# Patient Record
Sex: FEMALE | Race: WHITE | Employment: FULL TIME | URBAN - METROPOLITAN AREA
[De-identification: names, ages, dates, MRNs, and addresses within clinical notes are randomized per-mention and may not be internally consistent; named-entity substitution may affect disease eponyms.]

---

## 2017-06-06 ENCOUNTER — HOSPITAL ENCOUNTER (OUTPATIENT)
Dept: OTHER | Age: 22
Discharge: HOME | End: 2017-06-30
Attending: OBSTETRICS & GYNECOLOGY | Admitting: OBSTETRICS & GYNECOLOGY

## 2017-06-15 ENCOUNTER — ROUTINE PRENATAL (OUTPATIENT)
Dept: PERINATAL CARE | Age: 22
End: 2017-06-15

## 2017-06-15 DIAGNOSIS — Z36.89 ENCOUNTER FOR FETAL ANATOMIC SURVEY: Primary | ICD-10-CM

## 2017-10-12 ENCOUNTER — HOSPITAL ENCOUNTER (OUTPATIENT)
Dept: OTHER | Age: 22
Discharge: HOME | End: 2017-12-10
Attending: OBSTETRICS & GYNECOLOGY | Admitting: OBSTETRICS & GYNECOLOGY

## 2017-10-24 PROBLEM — O48.0 POST TERM PREGNANCY OVER 40 WEEKS: Status: ACTIVE | Noted: 2017-10-24

## 2017-10-24 PROBLEM — Z3A.40 40 WEEKS GESTATION OF PREGNANCY: Status: ACTIVE | Noted: 2017-10-24

## 2017-10-24 PROBLEM — O99.013 MATERNAL ANEMIA, ANTEPARTUM, THIRD TRIMESTER: Status: ACTIVE | Noted: 2017-10-24

## 2017-10-24 PROBLEM — Z34.03 SUPERVISION OF LOW-RISK FIRST PREGNANCY, THIRD TRIMESTER: Status: ACTIVE | Noted: 2017-10-24

## 2017-10-24 PROBLEM — R52 PAIN DURING LABOR: Status: ACTIVE | Noted: 2017-10-24

## 2017-10-24 PROBLEM — R52 PAIN DURING LABOR: Status: RESOLVED | Noted: 2017-10-24 | Resolved: 2017-10-24

## 2020-09-11 ENCOUNTER — HOSPITAL ENCOUNTER (EMERGENCY)
Age: 25
Discharge: ANOTHER HOSPITAL | End: 2020-09-11
Attending: EMERGENCY MEDICINE
Payer: COMMERCIAL

## 2020-09-11 ENCOUNTER — APPOINTMENT (OUTPATIENT)
Dept: CT IMAGING | Age: 25
End: 2020-09-11
Payer: COMMERCIAL

## 2020-09-11 VITALS
WEIGHT: 128 LBS | BODY MASS INDEX: 20.57 KG/M2 | SYSTOLIC BLOOD PRESSURE: 125 MMHG | TEMPERATURE: 99.3 F | HEART RATE: 112 BPM | RESPIRATION RATE: 18 BRPM | OXYGEN SATURATION: 100 % | HEIGHT: 66 IN | DIASTOLIC BLOOD PRESSURE: 88 MMHG

## 2020-09-11 LAB
A/G RATIO: 1.3 (ref 1.1–2.2)
ALBUMIN SERPL-MCNC: 4.4 G/DL (ref 3.4–5)
ALP BLD-CCNC: 79 U/L (ref 40–129)
ALT SERPL-CCNC: 14 U/L (ref 10–40)
ANION GAP SERPL CALCULATED.3IONS-SCNC: 16 MMOL/L (ref 3–16)
AST SERPL-CCNC: 30 U/L (ref 15–37)
BASOPHILS ABSOLUTE: 0 K/UL (ref 0–0.2)
BASOPHILS RELATIVE PERCENT: 0.2 %
BILIRUB SERPL-MCNC: 0.6 MG/DL (ref 0–1)
BUN BLDV-MCNC: 7 MG/DL (ref 7–20)
CALCIUM SERPL-MCNC: 9.6 MG/DL (ref 8.3–10.6)
CHLORIDE BLD-SCNC: 97 MMOL/L (ref 99–110)
CO2: 23 MMOL/L (ref 21–32)
CREAT SERPL-MCNC: <0.5 MG/DL (ref 0.6–1.1)
EOSINOPHILS ABSOLUTE: 0 K/UL (ref 0–0.6)
EOSINOPHILS RELATIVE PERCENT: 0.1 %
GFR AFRICAN AMERICAN: >60
GFR NON-AFRICAN AMERICAN: >60
GLOBULIN: 3.5 G/DL
GLUCOSE BLD-MCNC: 75 MG/DL (ref 70–99)
HCG QUALITATIVE: NEGATIVE
HCT VFR BLD CALC: 40.3 % (ref 36–48)
HEMOGLOBIN: 13.3 G/DL (ref 12–16)
LACTIC ACID, SEPSIS: 0.9 MMOL/L (ref 0.4–1.9)
LYMPHOCYTES ABSOLUTE: 2.1 K/UL (ref 1–5.1)
LYMPHOCYTES RELATIVE PERCENT: 15.2 %
MAGNESIUM: 2 MG/DL (ref 1.8–2.4)
MCH RBC QN AUTO: 27 PG (ref 26–34)
MCHC RBC AUTO-ENTMCNC: 32.9 G/DL (ref 31–36)
MCV RBC AUTO: 82.1 FL (ref 80–100)
MONOCYTES ABSOLUTE: 1.2 K/UL (ref 0–1.3)
MONOCYTES RELATIVE PERCENT: 8.8 %
NEUTROPHILS ABSOLUTE: 10.5 K/UL (ref 1.7–7.7)
NEUTROPHILS RELATIVE PERCENT: 75.7 %
PDW BLD-RTO: 13.8 % (ref 12.4–15.4)
PLATELET # BLD: 245 K/UL (ref 135–450)
PMV BLD AUTO: 8.6 FL (ref 5–10.5)
POTASSIUM REFLEX MAGNESIUM: 3.4 MMOL/L (ref 3.5–5.1)
RBC # BLD: 4.91 M/UL (ref 4–5.2)
SODIUM BLD-SCNC: 136 MMOL/L (ref 136–145)
TOTAL PROTEIN: 7.9 G/DL (ref 6.4–8.2)
WBC # BLD: 13.9 K/UL (ref 4–11)

## 2020-09-11 PROCEDURE — 36415 COLL VENOUS BLD VENIPUNCTURE: CPT

## 2020-09-11 PROCEDURE — 80053 COMPREHEN METABOLIC PANEL: CPT

## 2020-09-11 PROCEDURE — 87040 BLOOD CULTURE FOR BACTERIA: CPT

## 2020-09-11 PROCEDURE — 6360000002 HC RX W HCPCS: Performed by: EMERGENCY MEDICINE

## 2020-09-11 PROCEDURE — 84703 CHORIONIC GONADOTROPIN ASSAY: CPT

## 2020-09-11 PROCEDURE — 70491 CT SOFT TISSUE NECK W/DYE: CPT

## 2020-09-11 PROCEDURE — 96365 THER/PROPH/DIAG IV INF INIT: CPT

## 2020-09-11 PROCEDURE — 83735 ASSAY OF MAGNESIUM: CPT

## 2020-09-11 PROCEDURE — 93005 ELECTROCARDIOGRAM TRACING: CPT | Performed by: EMERGENCY MEDICINE

## 2020-09-11 PROCEDURE — 2580000003 HC RX 258: Performed by: EMERGENCY MEDICINE

## 2020-09-11 PROCEDURE — 85025 COMPLETE CBC W/AUTO DIFF WBC: CPT

## 2020-09-11 PROCEDURE — 99284 EMERGENCY DEPT VISIT MOD MDM: CPT

## 2020-09-11 PROCEDURE — 6360000004 HC RX CONTRAST MEDICATION: Performed by: EMERGENCY MEDICINE

## 2020-09-11 PROCEDURE — 83605 ASSAY OF LACTIC ACID: CPT

## 2020-09-11 PROCEDURE — 96375 TX/PRO/DX INJ NEW DRUG ADDON: CPT

## 2020-09-11 RX ORDER — KETOROLAC TROMETHAMINE 30 MG/ML
15 INJECTION, SOLUTION INTRAMUSCULAR; INTRAVENOUS ONCE
Status: COMPLETED | OUTPATIENT
Start: 2020-09-11 | End: 2020-09-11

## 2020-09-11 RX ORDER — AMOXICILLIN 500 MG/1
500 CAPSULE ORAL 3 TIMES DAILY
Status: ON HOLD | COMMUNITY
End: 2020-09-12 | Stop reason: HOSPADM

## 2020-09-11 RX ORDER — 0.9 % SODIUM CHLORIDE 0.9 %
30 INTRAVENOUS SOLUTION INTRAVENOUS ONCE
Status: COMPLETED | OUTPATIENT
Start: 2020-09-11 | End: 2020-09-11

## 2020-09-11 RX ADMIN — AMPICILLIN SODIUM AND SULBACTAM SODIUM 3 G: 2; 1 INJECTION, POWDER, FOR SOLUTION INTRAMUSCULAR; INTRAVENOUS at 20:46

## 2020-09-11 RX ADMIN — IOPAMIDOL 75 ML: 755 INJECTION, SOLUTION INTRAVENOUS at 21:08

## 2020-09-11 RX ADMIN — SODIUM CHLORIDE 1743 ML: 9 INJECTION, SOLUTION INTRAVENOUS at 20:45

## 2020-09-11 RX ADMIN — KETOROLAC TROMETHAMINE 15 MG: 30 INJECTION, SOLUTION INTRAMUSCULAR at 20:45

## 2020-09-11 ASSESSMENT — ENCOUNTER SYMPTOMS
ABDOMINAL PAIN: 0
VOICE CHANGE: 1
EYE DISCHARGE: 0
DIARRHEA: 0
BACK PAIN: 0
SORE THROAT: 1
COUGH: 0
VOMITING: 0
NAUSEA: 0

## 2020-09-11 ASSESSMENT — PAIN DESCRIPTION - PAIN TYPE: TYPE: ACUTE PAIN

## 2020-09-11 ASSESSMENT — PAIN SCALES - GENERAL
PAINLEVEL_OUTOF10: 8
PAINLEVEL_OUTOF10: 8
PAINLEVEL_OUTOF10: 0

## 2020-09-11 ASSESSMENT — PAIN DESCRIPTION - DESCRIPTORS: DESCRIPTORS: SORE

## 2020-09-11 ASSESSMENT — PAIN DESCRIPTION - FREQUENCY: FREQUENCY: CONTINUOUS

## 2020-09-11 ASSESSMENT — PAIN DESCRIPTION - LOCATION: LOCATION: THROAT

## 2020-09-11 NOTE — ED TRIAGE NOTES
Presents with c/o sore throat for several days. Went to the [facility] clinic and diagnosed with strep throat. Started amoxil 2 days ago. Was told to come back if she worsened. Seen approx 1 hour ago and told she had peritonsillar abscess and to come to ER.

## 2020-09-12 ENCOUNTER — HOSPITAL ENCOUNTER (INPATIENT)
Age: 25
LOS: 1 days | Discharge: HOME | DRG: 133 | End: 2020-09-12
Attending: STUDENT IN AN ORGANIZED HEALTH CARE EDUCATION/TRAINING PROGRAM | Admitting: INTERNAL MEDICINE
Payer: COMMERCIAL

## 2020-09-12 VITALS
HEART RATE: 98 BPM | BODY MASS INDEX: 20.57 KG/M2 | DIASTOLIC BLOOD PRESSURE: 78 MMHG | RESPIRATION RATE: 18 BRPM | WEIGHT: 128 LBS | TEMPERATURE: 98.3 F | OXYGEN SATURATION: 97 % | HEIGHT: 66 IN | SYSTOLIC BLOOD PRESSURE: 123 MMHG

## 2020-09-12 PROBLEM — J36 PERITONSILLAR ABSCESS: Status: ACTIVE | Noted: 2020-09-12

## 2020-09-12 LAB
ANION GAP SERPL CALCULATED.3IONS-SCNC: 13 MMOL/L (ref 3–16)
BASOPHILS ABSOLUTE: 0 K/UL (ref 0–0.2)
BASOPHILS RELATIVE PERCENT: 0.5 %
BUN BLDV-MCNC: 8 MG/DL (ref 7–20)
CALCIUM SERPL-MCNC: 9.5 MG/DL (ref 8.3–10.6)
CHLORIDE BLD-SCNC: 107 MMOL/L (ref 99–110)
CO2: 18 MMOL/L (ref 21–32)
CREAT SERPL-MCNC: <0.5 MG/DL (ref 0.6–1.1)
EOSINOPHILS ABSOLUTE: 0 K/UL (ref 0–0.6)
EOSINOPHILS RELATIVE PERCENT: 0 %
GFR AFRICAN AMERICAN: >60
GFR NON-AFRICAN AMERICAN: >60
GLUCOSE BLD-MCNC: 122 MG/DL (ref 70–99)
HCT VFR BLD CALC: 40.8 % (ref 36–48)
HEMOGLOBIN: 13.4 G/DL (ref 12–16)
LYMPHOCYTES ABSOLUTE: 0.9 K/UL (ref 1–5.1)
LYMPHOCYTES RELATIVE PERCENT: 9.3 %
MAGNESIUM: 2.1 MG/DL (ref 1.8–2.4)
MAGNESIUM: 2.5 MG/DL (ref 1.8–2.4)
MCH RBC QN AUTO: 27.2 PG (ref 26–34)
MCHC RBC AUTO-ENTMCNC: 32.8 G/DL (ref 31–36)
MCV RBC AUTO: 82.9 FL (ref 80–100)
MONOCYTES ABSOLUTE: 0.1 K/UL (ref 0–1.3)
MONOCYTES RELATIVE PERCENT: 1.6 %
NEUTROPHILS ABSOLUTE: 8.2 K/UL (ref 1.7–7.7)
NEUTROPHILS RELATIVE PERCENT: 88.6 %
PDW BLD-RTO: 13.9 % (ref 12.4–15.4)
PLATELET # BLD: 235 K/UL (ref 135–450)
PMV BLD AUTO: 9.2 FL (ref 5–10.5)
POTASSIUM SERPL-SCNC: 4.8 MMOL/L (ref 3.5–5.1)
RBC # BLD: 4.93 M/UL (ref 4–5.2)
SODIUM BLD-SCNC: 138 MMOL/L (ref 136–145)
WBC # BLD: 9.2 K/UL (ref 4–11)

## 2020-09-12 PROCEDURE — 83735 ASSAY OF MAGNESIUM: CPT

## 2020-09-12 PROCEDURE — 0C9P3ZX DRAINAGE OF TONSILS, PERCUTANEOUS APPROACH, DIAGNOSTIC: ICD-10-PCS | Performed by: OTOLARYNGOLOGY

## 2020-09-12 PROCEDURE — 2060000000 HC ICU INTERMEDIATE R&B

## 2020-09-12 PROCEDURE — 99253 IP/OBS CNSLTJ NEW/EST LOW 45: CPT | Performed by: OTOLARYNGOLOGY

## 2020-09-12 PROCEDURE — 6370000000 HC RX 637 (ALT 250 FOR IP): Performed by: OTOLARYNGOLOGY

## 2020-09-12 PROCEDURE — 6360000002 HC RX W HCPCS: Performed by: INTERNAL MEDICINE

## 2020-09-12 PROCEDURE — 42700 I&D ABSCESS PERITONSILLAR: CPT | Performed by: OTOLARYNGOLOGY

## 2020-09-12 PROCEDURE — 2580000003 HC RX 258: Performed by: INTERNAL MEDICINE

## 2020-09-12 PROCEDURE — 87205 SMEAR GRAM STAIN: CPT

## 2020-09-12 PROCEDURE — 36415 COLL VENOUS BLD VENIPUNCTURE: CPT

## 2020-09-12 PROCEDURE — 99284 EMERGENCY DEPT VISIT MOD MDM: CPT

## 2020-09-12 PROCEDURE — 85025 COMPLETE CBC W/AUTO DIFF WBC: CPT

## 2020-09-12 PROCEDURE — 80048 BASIC METABOLIC PNL TOTAL CA: CPT

## 2020-09-12 PROCEDURE — 87070 CULTURE OTHR SPECIMN AEROBIC: CPT

## 2020-09-12 PROCEDURE — 2500000003 HC RX 250 WO HCPCS: Performed by: OTOLARYNGOLOGY

## 2020-09-12 RX ORDER — ONDANSETRON 2 MG/ML
4 INJECTION INTRAMUSCULAR; INTRAVENOUS EVERY 6 HOURS PRN
Status: DISCONTINUED | OUTPATIENT
Start: 2020-09-12 | End: 2020-09-13 | Stop reason: HOSPADM

## 2020-09-12 RX ORDER — HYDROCODONE BITARTRATE AND ACETAMINOPHEN 5; 325 MG/1; MG/1
1 TABLET ORAL EVERY 4 HOURS PRN
Qty: 30 TABLET | Refills: 0 | Status: SHIPPED | OUTPATIENT
Start: 2020-09-12 | End: 2020-09-17

## 2020-09-12 RX ORDER — AMOXICILLIN AND CLAVULANATE POTASSIUM 562.5; 437.5; 62.5 MG/1; MG/1; MG/1
2 TABLET, FILM COATED, EXTENDED RELEASE ORAL 2 TIMES DAILY
Qty: 40 TABLET | Refills: 0 | Status: SHIPPED | OUTPATIENT
Start: 2020-09-12 | End: 2020-09-22

## 2020-09-12 RX ORDER — ACETAMINOPHEN 650 MG/1
650 SUPPOSITORY RECTAL EVERY 6 HOURS PRN
Status: DISCONTINUED | OUTPATIENT
Start: 2020-09-12 | End: 2020-09-13 | Stop reason: HOSPADM

## 2020-09-12 RX ORDER — SODIUM CHLORIDE 0.9 % (FLUSH) 0.9 %
10 SYRINGE (ML) INJECTION PRN
Status: DISCONTINUED | OUTPATIENT
Start: 2020-09-12 | End: 2020-09-13 | Stop reason: HOSPADM

## 2020-09-12 RX ORDER — MORPHINE SULFATE 2 MG/ML
2 INJECTION, SOLUTION INTRAMUSCULAR; INTRAVENOUS EVERY 4 HOURS PRN
Status: DISCONTINUED | OUTPATIENT
Start: 2020-09-12 | End: 2020-09-13 | Stop reason: HOSPADM

## 2020-09-12 RX ORDER — PROMETHAZINE HYDROCHLORIDE 25 MG/1
12.5 TABLET ORAL EVERY 6 HOURS PRN
Status: DISCONTINUED | OUTPATIENT
Start: 2020-09-12 | End: 2020-09-13 | Stop reason: HOSPADM

## 2020-09-12 RX ORDER — POLYETHYLENE GLYCOL 3350 17 G/17G
17 POWDER, FOR SOLUTION ORAL DAILY PRN
Status: DISCONTINUED | OUTPATIENT
Start: 2020-09-12 | End: 2020-09-13 | Stop reason: HOSPADM

## 2020-09-12 RX ORDER — ACETAMINOPHEN 325 MG/1
650 TABLET ORAL EVERY 6 HOURS PRN
Status: DISCONTINUED | OUTPATIENT
Start: 2020-09-12 | End: 2020-09-13 | Stop reason: HOSPADM

## 2020-09-12 RX ORDER — LIDOCAINE HYDROCHLORIDE AND EPINEPHRINE 10; 10 MG/ML; UG/ML
20 INJECTION, SOLUTION INFILTRATION; PERINEURAL ONCE
Status: DISCONTINUED | OUTPATIENT
Start: 2020-09-12 | End: 2020-09-12 | Stop reason: RX

## 2020-09-12 RX ORDER — POTASSIUM CHLORIDE AND SODIUM CHLORIDE 900; 300 MG/100ML; MG/100ML
INJECTION, SOLUTION INTRAVENOUS CONTINUOUS
Status: DISCONTINUED | OUTPATIENT
Start: 2020-09-12 | End: 2020-09-13 | Stop reason: HOSPADM

## 2020-09-12 RX ORDER — SODIUM CHLORIDE 0.9 % (FLUSH) 0.9 %
10 SYRINGE (ML) INJECTION EVERY 12 HOURS SCHEDULED
Status: DISCONTINUED | OUTPATIENT
Start: 2020-09-12 | End: 2020-09-13 | Stop reason: HOSPADM

## 2020-09-12 RX ORDER — DEXAMETHASONE SODIUM PHOSPHATE 4 MG/ML
4 INJECTION, SOLUTION INTRA-ARTICULAR; INTRALESIONAL; INTRAMUSCULAR; INTRAVENOUS; SOFT TISSUE EVERY 6 HOURS
Status: DISCONTINUED | OUTPATIENT
Start: 2020-09-12 | End: 2020-09-13 | Stop reason: HOSPADM

## 2020-09-12 RX ADMIN — SODIUM CHLORIDE 3 G: 900 INJECTION INTRAVENOUS at 08:56

## 2020-09-12 RX ADMIN — BENZOCAINE, BUTAMBEN, AND TETRACAINE HYDROCHLORIDE 1 SPRAY: .028; .004; .004 AEROSOL, SPRAY TOPICAL at 19:00

## 2020-09-12 RX ADMIN — DEXAMETHASONE SODIUM PHOSPHATE 4 MG: 4 INJECTION, SOLUTION INTRAMUSCULAR; INTRAVENOUS at 13:30

## 2020-09-12 RX ADMIN — SODIUM CHLORIDE 3 G: 900 INJECTION INTRAVENOUS at 19:49

## 2020-09-12 RX ADMIN — POTASSIUM CHLORIDE AND SODIUM CHLORIDE: 900; 300 INJECTION, SOLUTION INTRAVENOUS at 14:46

## 2020-09-12 RX ADMIN — DEXAMETHASONE SODIUM PHOSPHATE 4 MG: 4 INJECTION, SOLUTION INTRAMUSCULAR; INTRAVENOUS at 07:36

## 2020-09-12 RX ADMIN — ENOXAPARIN SODIUM 40 MG: 40 INJECTION SUBCUTANEOUS at 08:56

## 2020-09-12 RX ADMIN — SODIUM CHLORIDE 3 G: 900 INJECTION INTRAVENOUS at 02:10

## 2020-09-12 RX ADMIN — POTASSIUM CHLORIDE AND SODIUM CHLORIDE: 900; 300 INJECTION, SOLUTION INTRAVENOUS at 02:10

## 2020-09-12 RX ADMIN — DEXAMETHASONE SODIUM PHOSPHATE 4 MG: 4 INJECTION, SOLUTION INTRAMUSCULAR; INTRAVENOUS at 19:49

## 2020-09-12 RX ADMIN — ONDANSETRON 4 MG: 2 INJECTION INTRAMUSCULAR; INTRAVENOUS at 18:48

## 2020-09-12 RX ADMIN — MORPHINE SULFATE 2 MG: 2 INJECTION, SOLUTION INTRAMUSCULAR; INTRAVENOUS at 18:48

## 2020-09-12 RX ADMIN — LIDOCAINE HYDROCHLORIDE,EPINEPHRINE BITARTRATE 20 ML: 10; .01 INJECTION, SOLUTION INFILTRATION; PERINEURAL at 18:48

## 2020-09-12 RX ADMIN — SODIUM CHLORIDE 3 G: 900 INJECTION INTRAVENOUS at 14:46

## 2020-09-12 RX ADMIN — LIDOCAINE HYDROCHLORIDE 20 ML: 10; .005 INJECTION, SOLUTION EPIDURAL; INFILTRATION; INTRACAUDAL; PERINEURAL at 19:00

## 2020-09-12 RX ADMIN — DEXAMETHASONE SODIUM PHOSPHATE 4 MG: 4 INJECTION, SOLUTION INTRAMUSCULAR; INTRAVENOUS at 00:59

## 2020-09-12 ASSESSMENT — PAIN SCALES - GENERAL
PAINLEVEL_OUTOF10: 0

## 2020-09-12 NOTE — PROGRESS NOTES
[address] [facility]       Otolaryngology Consultation      Requesting Physician:  Latrice Hankins MD       Date of Consultation:  9/12/2020      History Obtained From:  patient, electronic medical record      [address] / Reason for Consult:  Peritonsillar abscess. HISTORY OF PRESENT ILLNESS:    (Location, Quality, Severity, Duration, Timing, Context, Modifying factors, Associated symptoms)     Bertin Trejo is a 22 y.o. female is a new patient to me. An otolaryngologic consultation was requested for evaluation of right peritonsilltis. She stated that she has had a sore throat for about 4 days, onset Tuesday it progressed and worsened. She presented to the [facility] ER last night and was transferred here for admission and further care. She was having difficulty opening her mouth . \"I could barely talk. \"  She stated that her voice was abnormal and sounded muffled. She was having trouble swallowing. She denied any dyspnea at any time. She stated that she is feeling better now than when in the ER. She denied recurrent strep throat. Her most recent episode of strep throat was about two years ago. She denied any other epsiodes in the past three years. REVIEW OF SYSTEMS:    I reviewed the ROS on the admission history and physical by Latrice Hankins MD on 9/12/2020 and there has been no change in the information. Past Medical History:   History reviewed. No pertinent past medical history. Past Surgical History:    History reviewed. No pertinent surgical history.     Current Medications:   Current Facility-Administered Medications: 0.9% NaCl with KCl 40 mEq infusion, , Intravenous, Continuous  dexamethasone (DECADRON) injection 4 mg, 4 mg, Intravenous, Q6H  ampicillin-sulbactam (UNASYN) 3 g ivpb minibag, 3 g, Intravenous, Q6H  sodium chloride flush 0.9 % injection 10 mL, 10 mL, Intravenous, 2 times per day  sodium chloride flush 0.9 % injection 10 mL, 10 mL, Intravenous, PRN  acetaminophen (TYLENOL) tablet 650 mg, 650 mg, Oral, Q6H PRN **OR** acetaminophen (TYLENOL) suppository 650 mg, 650 mg, Rectal, Q6H PRN  polyethylene glycol (GLYCOLAX) packet 17 g, 17 g, Oral, Daily PRN  promethazine (PHENERGAN) tablet 12.5 mg, 12.5 mg, Oral, Q6H PRN **OR** ondansetron (ZOFRAN) injection 4 mg, 4 mg, Intravenous, Q6H PRN  enoxaparin (LOVENOX) injection 40 mg, 40 mg, Subcutaneous, Daily  morphine (PF) injection 2 mg, 2 mg, Intravenous, Q4H PRN    Allergies:  No Known Allergies     Social History:    TOBACCO:   reports that she has never smoked. She has never used smokeless tobacco.  ETOH:   reports no history of alcohol use. Family History:       Problem Relation Age of Onset    High Blood Pressure Maternal Grandmother     Heart Disease Maternal Grandfather     Stroke Maternal Grandfather            PHYSICAL EXAM:      Constitutional:    Vitals:    Vitals:    09/12/20 1300   BP: 116/80   Pulse: 94   Resp: 18   Temp: 98.2 °F (36.8 °C)   SpO2: 97%        General appearance:  WDWN NAD, no stridor, does not appear toxic. Voice:  Normal, with no hot potato voice. Eyes:  EOMI. Sclerae and conjunctivae normal    Ears, Nose, Mouth and Throat:  Mild fullness right anterior tonsillar pillar. Mild trismus to 1.5 tongue blade width at central incisor. TMs and EACs normal.  Intranasal exam normal.  OC normal.    Neck:  Mild tenderness right anterior cervical area. Lymphatic:  Facial, neck and supraclavicular lymph nodes were normal to palpation.   Musculoskeletal:  REDDING        LABORATORY:  CBC with Differential:    Lab Results   Component Value Date    WBC 13.9 09/11/2020    RBC 4.91 09/11/2020    HGB 13.3 09/11/2020    HCT 40.3 09/11/2020     09/11/2020    MCV 82.1 09/11/2020    MCH 27.0 09/11/2020    MCHC 32.9 09/11/2020    RDW 13.8 09/11/2020    LYMPHOPCT 15.2 09/11/2020    MONOPCT 8.8 09/11/2020    BASOPCT 0.2 09/11/2020    MONOSABS 1.2 09/11/2020    LYMPHSABS 2.1 09/11/2020    EOSABS 0.0 09/11/2020    BASOSABS 0.0 09/11/2020         REVIEW OF IMAGING:  Narrative    EXAMINATION:    CT OF THE NECK SOFT TISSUE WITH CONTRAST  9/11/2020      FINDINGS:    PHARYNX/LARYNX:  The right palatine tonsil is asymmetrically enlarged with a    central, irregularity of hypodensity or fluid measuring 1.9 x 1.4 cm.  There    is mass effect on the parapharyngeal space.         The airway remains widely patent.  Epiglottis is normal in thickness.  False    and true vocal cords are normal.  There is no prevertebral soft tissue    swelling.         SALIVARY GLANDS/THYROID:  The parotid and submandibular glands appear    unremarkable.  The thyroid gland appears unremarkable.         LYMPH NODES:  There are enlarged right cervical lymph nodes.  For instance a    right level 2 node is 1.4 x 1.1 cm.  A left level 2 lymph node is 1.7 x 1.1    cm.         SOFT TISSUES:  No appreciable soft tissue swelling or mass is seen.         BRAIN/ORBITS/SINUSES:  The visualized portion of the intracranial contents    appear unremarkable.  The visualized portion of the orbits, paranasal sinuses    and mastoid air cells demonstrate no acute abnormality.         LUNG APICES/SUPERIOR MEDIASTINUM:  No focal consolidation is seen within the    visualized lung apices.  No superior mediastinal lymphadenopathy or mass. The visualized portion of the trachea appears unremarkable.         BONES:  There is mild reverse curvature of the cervical spine.  No acute    osseous abnormality is seen. [place] is no significant spondylosis.           Impression    1.9 x 1.4 cm right peritonsillar abscess.         Bilateral cervical adenopathy, more prominent on the right side.               IMPRESSION:   Right peritonsillar cellulitis versus abscess, improved on IV antibiotics. RECOMMENDATIONS:    1. Continue IV antibiotics. 2. Incision and drainage if no improvement after 24 to 48 hours of IV antibiotics.         TIME:  Total unit time = 55 minutes.

## 2020-09-12 NOTE — PROCEDURES
PROCEDURE:  Needle drainage aspiration of right peritonsillar abscess, at the bedside. INFORMED CONSENT:  The patient was counseled for the procedure, which was described. She was advised of the risks and potential complications, including but not limited to:  Bleeding, pain, and persistent abscess requiring incision and drainage . All patients questions were answered. Patient stated she understands and accepts all of the preceding and desires to proceed. DESCRIPTION OF PROCEDURE:  The right anterior tonsillar pillar was sprayed with Cetacaine spray. After 60-90 seconds, the left anterior tonsillar pillar was infiltrated with 3 ml of 2% lidocaine with 1:100,000 epinephrine. After 5 minutes, the needle aspiration was performed with an 18 gauge needle and 10 cc syringe, aspirating 2 ml of purulent exudate. Specimen placed sterilely on culture swab for all bacterial pathogens, culture and sensitivity. Patient tolerated the procedure well with no complications. PLAN:  1. Will change to Augmentin XR 2 po BID for 10 days. 2. Patient agreed to return to the ER at [facility] if her condition worsens. 3. Patient agreed to see me as outpatient if she continues to experience recurrent strept throat or tonsillitis or if she has another episode of peritonsillar abscess, in which case a tonsillectomy would be indicated. 4. If no improvement after 24 hours, will take to OR for incision and drainage.

## 2020-09-12 NOTE — H&P
pertinent surgical history. Medications (prior to admission):  Prior to Admission medications    Medication Sig Start Date End Date Taking? Authorizing Provider   amoxicillin (AMOXIL) 500 MG capsule Take 500 mg by mouth 3 times daily   Yes Historical Provider, MD       Allergy(ies):  Patient has no known allergies. Social History:  TOBACCO:  reports that she has never smoked. She has never used smokeless tobacco.  ETOH:  reports no history of alcohol use. Family History:      Problem Relation Age of Onset    High Blood Pressure Maternal Grandmother     Heart Disease Maternal Grandfather     Stroke Maternal Grandfather        Review of Systems:  Pertinent positives are listed in HPI. At least 10-point ROS reviewed and were negative. Vitals and physical examination:  BP (!) 156/106   Pulse 121   Temp 98.3 °F (36.8 °C) (Oral)   Resp 16   Wt 128 lb (58.1 kg)   SpO2 100%   BMI 20.66 kg/m²   Gen/overall appearance: Not in acute distress. Alert. Oriented x3. Head: Normocephalic, atraumatic  Eyes: EOMI, good acuity  ENT: Right peritonsillar exudate, bilateral tonsillar erythema. Neck: No JVD, thyromegaly  CVS: Nml S1S2, no MRG. Tachycardic. Pulm: Clear bilaterally. No crackles/wheezes  Gastrointestinal: Soft, NT/ND, +BS  Musculoskeletal: No edema. Warm  Neuro: No focal deficit. Moves extremity spontaneously. Psychiatry: Appropriate affect. Not agitated. Skin: Warm, dry with normal turgor.  No rash  Capillary refill: Brisk,< 3 seconds   Peripheral Pulses: +2 palpable, equal bilaterally       Labs/imaging/EKG:  CBC:   Recent Labs     09/11/20 2023   WBC 13.9*   HGB 13.3        BMP:    Recent Labs     09/11/20 2023      K 3.4*   CL 97*   CO2 23   BUN 7   CREATININE <0.5*   GLUCOSE 75     Hepatic:   Recent Labs     09/11/20 2023   AST 30   ALT 14   BILITOT 0.6   ALKPHOS 79       Ct Soft Tissue Neck W Contrast    Result Date: 9/11/2020  EXAMINATION: CT OF THE NECK SOFT TISSUE WITH CONTRAST  9/11/2020 TECHNIQUE: CT of the neck was performed with the administration of intravenous contrast. Multiplanar reformatted images are provided for review. Dose modulation, iterative reconstruction, and/or weight based adjustment of the mA/kV was utilized to reduce the radiation dose to as low as reasonably achievable. COMPARISON: None. HISTORY: ORDERING SYSTEM PROVIDED HISTORY: right peritonsilar abscess TECHNOLOGIST PROVIDED HISTORY: Reason for exam:->right peritonsilar abscess Reason for Exam: right peritonsilar abscess Acuity: Acute Type of Exam: Initial FINDINGS: PHARYNX/LARYNX:  The right palatine tonsil is asymmetrically enlarged with a central, irregularity of hypodensity or fluid measuring 1.9 x 1.4 cm. There is mass effect on the parapharyngeal space. The airway remains widely patent. Epiglottis is normal in thickness. False and true vocal cords are normal.  There is no prevertebral soft tissue swelling. SALIVARY GLANDS/THYROID:  The parotid and submandibular glands appear unremarkable. The thyroid gland appears unremarkable. LYMPH NODES:  There are enlarged right cervical lymph nodes. For instance a right level 2 node is 1.4 x 1.1 cm. A left level 2 lymph node is 1.7 x 1.1 cm. SOFT TISSUES:  No appreciable soft tissue swelling or mass is seen. BRAIN/ORBITS/SINUSES:  The visualized portion of the intracranial contents appear unremarkable. The visualized portion of the orbits, paranasal sinuses and mastoid air cells demonstrate no acute abnormality. LUNG APICES/SUPERIOR MEDIASTINUM:  No focal consolidation is seen within the visualized lung apices. No superior mediastinal lymphadenopathy or mass. The visualized portion of the trachea appears unremarkable. BONES:  There is mild reverse curvature of the cervical spine. No acute osseous abnormality is seen. There is no significant spondylosis. 1.9 x 1.4 cm right peritonsillar abscess.  Bilateral cervical adenopathy, more prominent on the

## 2020-09-12 NOTE — DISCHARGE INSTR - COC
Continuity of Care Form    Patient Name: Ruddy Cheng   :  1995  MRN:  [id number]    Admit date:  2020  Discharge date:  ***    Code Status Order: Full Code   Advance Directives:   Advance Care Flowsheet Documentation     Date/Time Healthcare Directive Type of Healthcare Directive Copy in [address] Agent's Name Healthcare Agent's Phone Number    [phone]  No, patient does not have an advance directive for healthcare treatment -- -- -- -- --          Admitting Physician:  Sonia Gray MD  PCP: Referring Not In System (Inactive)    Discharging Nurse: [facility] Unit/Room#: [id number]-[id number]/2727-64  Discharging Unit Phone Number: ***    Emergency Contact:   Extended Emergency Contact Information  Primary Emergency Contact: Zenia Zelaya  Address: [facility]. [place] [address] [place] [address]  Home Phone: [phone]  Mobile Phone: [phone]  Relation: Parent  Secondary Emergency Contact: Emigdio Butler  Address: [address]  Home Phone: [phone]  Mobile Phone: [phone]  Relation: Parent    Past Surgical History:  History reviewed. No pertinent surgical history. Immunization History: There is no immunization history for the selected administration types on file for this patient.     Active Problems:  Patient Active Problem List   Diagnosis Code    Supervision of low-risk first pregnancy, third trimester Z34.03    Post term pregnancy over 40 weeks O48.0    40 weeks gestation of pregnancy Z3A.40    Maternal anemia, antepartum, third trimester O99.013    Vaginal delivery O80    Peritonsillar abscess J36       Isolation/Infection:   Isolation          No Isolation        Patient Infection Status     None to display          Nurse Assessment:  Last Vital Signs: BP (!) 165/115   Pulse 127   Temp 99.2 °F (37.3 °C) (Oral)   Resp 20   Ht 5' 6\" (1.676 m)   Wt 128 lb (58.1 kg)   SpO2 95%   BMI 20.66 kg/m²     Last documented pain score (0-10 scale): Pain Level: 0  Last Weight:   Wt Readings from Last 1 Encounters:   20 128 lb (58.1 kg)     Mental Status:  {IP PT MENTAL STATUS:99889}    IV Access:  { MIRYAM IV ACCESS:612572489}    Nursing Mobility/ADLs:  Walking   {CHP DME RPNH:131850222}  Transfer  {CHP DME YBYY:021164450}  Bathing  {CHP DME FRWK:775689302}  Dressing  {CHP DME TWML:807019003}  Toileting  {CHP DME DPDI:432315475}  Feeding  {CHP DME VKV}  Med Admin  {CHP DME XKYA:611490199}  Med Delivery   { MIRYAM MED Delivery:946688055}    Wound Care Documentation and Therapy:        Elimination:  Continence:   · Bowel: {YES / EE:27948}  · Bladder: {YES / MS:43665}  Urinary Catheter: {Urinary Catheter:939582206}   Colostomy/Ileostomy/Ileal Conduit: {YES / BE:48180}       Date of Last BM: ***    Intake/Output Summary (Last 24 hours) at 2020  Last data filed at 2020 1446  Gross per 24 hour   Intake 1358 ml   Output --   Net 1358 ml     I/O last 3 completed shifts: In: 4304 [P.O.:240;  I.V.:1018; IV Piggyback:100]  Out: -     Safety Concerns:     508 RSens Safety Concerns:458770396}    Impairments/Disabilities:      [address] RSens Impairments/Disabilities:800575518}    Nutrition Therapy:  Current Nutrition Therapy:   508 RSens Diet List:906524816}    Routes of Feeding: {CHP DME Other Feedings:269374916}  Liquids: {Slp liquid thickness:80160}  Daily Fluid Restriction: {CHP DME Yes amt example:966261438}  Last Modified Barium Swallow with Video (Video Swallowing Test): {Done Not Done NGUE:118016751}    Treatments at the Time of Hospital Discharge:   Respiratory Treatments: ***  Oxygen Therapy:  {Therapy; copd oxygen:92812}  Ventilator:    { CC Vent MQGW:391255555}    Rehab Therapies: {THERAPEUTIC INTERVENTION:4921315239}  Weight Bearing Status/Restrictions: [address] Sherri BAZAN Weight Bearin}  Other Medical Equipment (for information only, NOT a DME order):  {EQUIPMENT:928726923}  Other Treatments: ***    Patient's personal belongings (please select all that are sent with patient):  {CHP DME Belongings:938132065}    RN SIGNATURE:  {Esignature:072148448}    CASE MANAGEMENT/SOCIAL WORK SECTION    Inpatient Status Date: ***    Readmission Risk Assessment Score:  Readmission Risk              Risk of Unplanned Readmission:        9           Discharging to Facility/ Agency   · Name:   · Address:  · Phone:  · Fax:    Dialysis Facility (if applicable)   · Name:  · Address:  · Dialysis Schedule:  · Phone:  · Fax:    / signature: {Esignature:566702594}    PHYSICIAN SECTION    Prognosis: {Prognosis:7195631065}    Condition at Discharge: [facility] Patient Condition:247774829}    Rehab Potential (if transferring to Rehab): {Prognosis:4659394744}    Recommended Labs or Other Treatments After Discharge: ***    Physician Certification: I certify the above information and transfer of Emerson Cooper  is necessary for the continuing treatment of the diagnosis listed and that she requires {Admit to Appropriate Level of Care:09363} for {GREATER/LESS:922629140} 30 days.      Update Admission H&P: {CHP DME Changes in FAGPQ:431894817}    PHYSICIAN SIGNATURE:  {Esignature:217761927}

## 2020-09-12 NOTE — ED PROVIDER NOTES
Primary Care Physician: Referring Not In System (Inactive)   Attending Physician: Maty Frye MD     History   Chief Complaint   Patient presents with    Pharyngitis     Pt seen at [facility] three days ago and told she had strep. Returned when pain and swelling occurred today, told to go to ED at [facility]. Came here to see Dr. Michael SALAZAR   Winnie Portillo is a 22 y.o. female no significant history who presents this evening transfer from [facility] for peritonsillar abscess. She states that she developed some neck pain or sore throat 4 days ago and was seen at urgent care. He was treated with antibiotics and recommended to return to the emergency room if her symptoms were worse. Started taking antibiotics for 4 days she did not feel any relief. Her swelling and pain got worse and she was reevaluated at outside hospital ED where a CT was scan and showed peritonsillar abscess. ENT was consulted and recommend patient be transferred to [facility] for evaluation. Patient was given Unasyn and 2 L of normal saline. She states that she is feeling much better but her heart rate is still elevated. History reviewed. No pertinent past medical history. History reviewed. No pertinent surgical history. Family History   Problem Relation Age of Onset    High Blood Pressure Maternal Grandmother     Heart Disease Maternal Grandfather     Stroke Maternal Grandfather         Social History     Socioeconomic History    Marital status: Single     Spouse name: None    Number of children: None    Years of education: None    Highest education level: None   Occupational History    None   Social Needs    Financial resource strain: None    Food insecurity     Worry: None     Inability: None    Transportation needs     Medical: None     Non-medical: None   Tobacco Use    Smoking status: Never Smoker    Smokeless tobacco: Never Used   Substance and Sexual Activity    Alcohol use: No    Drug use:  No  Sexual activity: Yes     Partners: Male   Lifestyle    Physical activity     Days per week: None     Minutes per session: None    Stress: None   Relationships    Social connections     Talks on phone: None     Gets together: None     Attends Sabianism service: None     Active member of club or organization: None     Attends meetings of clubs or organizations: None     Relationship status: None    Intimate partner violence     Fear of current or ex partner: None     Emotionally abused: None     Physically abused: None     Forced sexual activity: None   Other Topics Concern    None   Social History Narrative    None        Review of Systems   10 total systems reviewed and found to be negative unless otherwise noted in HPI     Physical Exam   /87   Pulse 108   Temp 98.9 °F (37.2 °C) (Oral)   Resp 14   Ht 5' 6\" (1.676 m)   Wt 128 lb (58.1 kg)   SpO2 98%   BMI 20.66 kg/m²      CONSTITUTIONAL: Well appearing, in no acute distress   HEAD: atraumatic, normocephalic   EYES: PERRL, No injection, discharge or scleral icterus. ENT: Moist mucous membranes. NECK: Normal ROM, but adenopathy with some tonsillar exudates  CARDIOVASCULAR: Regular rate and rhythm. No murmurs or gallop. PULMONARY/CHEST: Airway patent. No retractions. Breath sounds clear with good air entry bilaterally. ABDOMEN: Soft, Non-distended and non-tender, without guarding or rebound. SKIN: Acyanotic, warm, dry   MUSCULOSKELETAL: No swelling, tenderness or deformity   NEUROLOGICAL: Awake and oriented x 3. Pulses intact. Grossly nonfocal   Nursing note and vitals reviewed.      ED Course & Medical Decision Making   Medications   0.9% NaCl with KCl 40 mEq infusion ( Intravenous New Bag 9/12/20 0210)   dexamethasone (DECADRON) injection 4 mg (4 mg Intravenous Given 9/12/20 0059)   ampicillin-sulbactam (UNASYN) 3 g ivpb minibag (0 g Intravenous Stopped 9/12/20 0235)   sodium chloride flush 0.9 % injection 10 mL (has no administration submandibular glands appear unremarkable. The thyroid gland appears unremarkable. LYMPH NODES:  There are enlarged right cervical lymph nodes. For instance a right level 2 node is 1.4 x 1.1 cm. A left level 2 lymph node is 1.7 x 1.1 cm. SOFT TISSUES:  No appreciable soft tissue swelling or mass is seen. BRAIN/ORBITS/SINUSES:  The visualized portion of the intracranial contents appear unremarkable. The visualized portion of the orbits, paranasal sinuses and mastoid air cells demonstrate no acute abnormality. LUNG APICES/SUPERIOR MEDIASTINUM:  No focal consolidation is seen within the visualized lung apices. No superior mediastinal lymphadenopathy or mass. The visualized portion of the trachea appears unremarkable. BONES:  There is mild reverse curvature of the cervical spine. No acute osseous abnormality is seen. There is no significant spondylosis. 1.9 x 1.4 cm right peritonsillar abscess. Bilateral cervical adenopathy, more prominent on the right side. PROCEDURES:   Procedures    ASSESSMENT AND PLAN:  Lanie Tierney is a 22 y.o. female presenting transferred from outside facility with peritonsillar abscess. She not been seen on day where CT was performed and showed peritonsillar abscess. ENT was consulted and patient was recommended the patient be transferred here because of privileges. Patient was given IV fluids and Unasyn and upon arrival she was stable. I did contact ENT we recommended admission to the medicine service for further evaluation and treatment. Hospitalist consulted and patient was admitted to their service for further treatment. .ClINICAL IMPRESSION:  1. Peritonsillar abscess        DISPOSITION Admitted 09/12/2020 12:36:42 AM   -Findings and recommendations explained to patient. She expressed understanding and agreed with the plan.   Thelma Torres MD (electronically signed)  9/12/2020  _________________________________________________________________________________________  _________________________________________________________________________________________  This record is transcribed utilizing voice recognition technology. There are inherent limitations in this technology. In addition, there may be limitations in editing of this report. If there are any discrepancies, please contact me directly.         Kala Akhtar MD  09/12/20 6338

## 2020-09-12 NOTE — CONSULTS
Patient seen. Full consult report to follow. IMPRESSION:  Right peritonsillar cellulitis versus early abscess, improved with IV antibiotics    RECOMMENDATIONS/PLAN:  1. Continue IV Unasyn. 2. Incision and drainage if no improvement after 24 to 48 hours of IV antibiotics.

## 2020-09-12 NOTE — ED NOTES
Bed: 01  Expected date:   Expected time:   Means of arrival:   Comments:  ENT pt.      Attila King RN  09/12/20 1740

## 2020-09-12 NOTE — ED PROVIDER NOTES
None   Other Topics Concern    None   Social History Narrative    None       SCREENINGS             PHYSICAL EXAM    (up to 7 for level 4, 8 or more for level 5)     ED Triage Vitals [09/11/20 1929]   BP Temp Temp Source Pulse Resp SpO2 Height Weight   (!) 143/110 99.3 °F (37.4 °C) Oral 126 18 99 % 5' 6\" (1.676 m) 128 lb (58.1 kg)       Physical Exam  Constitutional:       General: She is not in acute distress. Appearance: She is well-developed. HENT:      Head: Normocephalic and atraumatic. Right Ear: Tympanic membrane, ear canal and external ear normal.      Left Ear: Tympanic membrane, ear canal and external ear normal.      Nose: Nose normal.      Mouth/Throat:      Mouth: Mucous membranes are moist.      Tongue: No lesions. Tongue does not deviate from midline. Pharynx: No oropharyngeal exudate. Tonsils: Tonsillar abscess present. Eyes:      Conjunctiva/sclera: Conjunctivae normal.      Pupils: Pupils are equal, round, and reactive to light. Neck:      Musculoskeletal: Normal range of motion and neck supple. Cardiovascular:      Rate and Rhythm: Regular rhythm. Tachycardia present. Heart sounds: Normal heart sounds. No murmur. No friction rub. No gallop. Pulmonary:      Effort: Pulmonary effort is normal. No respiratory distress. Breath sounds: Normal breath sounds. No wheezing. Abdominal:      General: Bowel sounds are normal. There is no distension. Palpations: Abdomen is soft. Tenderness: There is no abdominal tenderness. There is no guarding or rebound. Musculoskeletal: Normal range of motion. General: No tenderness. Lymphadenopathy:      Cervical: Cervical adenopathy present. Right cervical: Superficial cervical adenopathy present. Skin:     General: Skin is warm and dry. Capillary Refill: Capillary refill takes less than 2 seconds. Findings: No rash. Neurological:      General: No focal deficit present.       Mental Status: She is alert and oriented to person, place, and time. Cranial Nerves: No cranial nerve deficit. DIAGNOSTIC RESULTS   LABS:    Results for orders placed or performed during the hospital encounter of 09/11/20   Lactate, Sepsis   Result Value Ref Range    Lactic Acid, Sepsis 0.9 0.4 - 1.9 mmol/L   CBC auto differential   Result Value Ref Range    WBC 13.9 (H) 4.0 - 11.0 K/uL    RBC 4.91 4.00 - 5.20 M/uL    Hemoglobin 13.3 12.0 - 16.0 g/dL    Hematocrit 40.3 36.0 - 48.0 %    MCV 82.1 80.0 - 100.0 fL    MCH 27.0 26.0 - 34.0 pg    MCHC 32.9 31.0 - 36.0 g/dL    RDW 13.8 12.4 - 15.4 %    Platelets 745 886 - 280 K/uL    MPV 8.6 5.0 - 10.5 fL    Neutrophils % 75.7 %    Lymphocytes % 15.2 %    Monocytes % 8.8 %    Eosinophils % 0.1 %    Basophils % 0.2 %    Neutrophils Absolute 10.5 (H) 1.7 - 7.7 K/uL    Lymphocytes Absolute 2.1 1.0 - 5.1 K/uL    Monocytes Absolute 1.2 0.0 - 1.3 K/uL    Eosinophils Absolute 0.0 0.0 - 0.6 K/uL    Basophils Absolute 0.0 0.0 - 0.2 K/uL   Comprehensive Metabolic Panel w/ Reflex to MG   Result Value Ref Range    Sodium 136 136 - 145 mmol/L    Potassium reflex Magnesium 3.4 (L) 3.5 - 5.1 mmol/L    Chloride 97 (L) 99 - 110 mmol/L    CO2 23 21 - 32 mmol/L    Anion Gap 16 3 - 16    Glucose 75 70 - 99 mg/dL    BUN 7 7 - 20 mg/dL    CREATININE <0.5 (L) 0.6 - 1.1 mg/dL    GFR Non-African American >60 >60    GFR African American >60 >60    Calcium 9.6 8.3 - 10.6 mg/dL    Total Protein 7.9 6.4 - 8.2 g/dL    Alb 4.4 3.4 - 5.0 g/dL    Albumin/Globulin Ratio 1.3 1.1 - 2.2    Total Bilirubin 0.6 0.0 - 1.0 mg/dL    Alkaline Phosphatase 79 40 - 129 U/L    ALT 14 10 - 40 U/L    AST 30 15 - 37 U/L    Globulin 3.5 g/dL   hCG, serum, qualitative   Result Value Ref Range    hCG Qual Negative Detects HCG level >10 MIU/mL   Magnesium   Result Value Ref Range    Magnesium 2.00 1.80 - 2.40 mg/dL       All other labs were within normal range ornot returned as of this dictation. EKG:  All EKG's are interpreted by the Emergency Department Physician who either signs or Co-signs this chart in the absence of a cardiologist.  Please see their note for interpretation of EKG. EKG Interpretation    Interpreted by emergency department physician    Rhythm: sinus tachycardia  Rate: tachycardia  Axis: normal  Ectopy: none  Conduction: normal  ST Segments: normal  T Waves: normal  Q Waves: none    Clinical Impression: sinus tachycardia      RADIOLOGY:   Non-plain film images such as CT, Ultrasound and MRI are read by the radiologist.  Plain radiographic images are visualized and preliminarily interpreted by the ED Provider with the belowfindings:    Interpretation per the Radiologist below, if available at the time of this note:    CT SOFT TISSUE NECK W CONTRAST   Final Result   1.9 x 1.4 cm right peritonsillar abscess. Bilateral cervical adenopathy, more prominent on the right side. PROCEDURES   Unless otherwise noted below, none     Procedures    CRITICAL CARE TIME   N/A    CONSULTS:  IP CONSULT TO OTOLARYNGOLOGY      EMERGENCY DEPARTMENT COURSE and DIFFERENTIAL DIAGNOSIS/MDM:   Vitals:    Vitals:    09/11/20 1929 09/11/20 2115 09/11/20 2245   BP: (!) 143/110 (!) 135/92 125/88   Pulse: 126 112 112   Resp: 18 18 18   Temp: 99.3 °F (37.4 °C)     TempSrc: Oral     SpO2: 99% 94% 100%   Weight: 128 lb (58.1 kg)     Height: 5' 6\" (1.676 m)         Patient was given the following medications:  Medications   0.9 % sodium chloride IV bolus 1,743 mL (0 mL/kg × 58.1 kg Intravenous Stopped 9/11/20 2241)   ampicillin-sulbactam (UNASYN) 3 g ivpb minibag (0 g Intravenous Stopped 9/11/20 2150)   ketorolac (TORADOL) injection 15 mg (15 mg Intravenous Given 9/11/20 2045)   iopamidol (ISOVUE-370) 76 % injection 75 mL (75 mLs Intravenous Given 9/11/20 2108)       ED Course as of Sep 12 0118   Fri Sep 11, 2020   2200 Patient states she does not have any pain at this point.   I discussed her imaging and lab findings with her.  Awaiting a callback from ear nose and throat. [TC]   1095 Case discussed with Dr. Aga Bradley, on-call for ear nose and throat. He would like the patient to be transferred to [facility] emergency department for possible bedside drainage. Case then discussed with Dr. Layo Alejandro, who has accepted transfer of the patient. Patient at this time would like to go private vehicle. I have explained to her the risks of doing so and she is willing to accept these risks. I reemphasized the need to go directly to their emergency department and to not eat or drink anything.    [TC]      ED Course User Index  [TC] Shyann Jeffers MD     I spoke with Dr. Layo Alejandro, ED physician at [facility]. We thoroughly discussed the history, physical exam, laboratory and imaging studies, as well as, current course of treatment within the emergency department. Based upon that discussion, we've decided to transfer King Ric to [facility], for further observation and evaluation of King Ric current condition. As I have deemed necessary from their history, physical, and studies, I have considered and evaluated King Ric for the following diagnoses: viral URI, nasal congestion, bacterial sinusitis, viral/strep pharyngitis, otitis media, otitis externa, RPA, PTA. /88   Pulse 112   Temp 99.3 °F (37.4 °C) (Oral)   Resp 18   Ht 5' 6\" (1.676 m)   Wt 128 lb (58.1 kg)   SpO2 100%   BMI 20.66 kg/m²     FINAL IMPRESSION      1. Peritonsillar abscess          DISPOSITION/PLAN   DISPOSITION Decision To Transfer 09/11/2020 10:43:36 PM      PATIENT REFERRED TO:  No follow-up provider specified.     DISCHARGE MEDICATIONS:  Discharge Medication List as of 9/11/2020 11:07 PM          DISCONTINUED MEDICATIONS:  Discharge Medication List as of 9/11/2020 11:07 PM                 (Please note that portions of this note were completed with a voice recognition program.  Efforts were made to edit the dictations but occasionally words

## 2020-09-12 NOTE — PROGRESS NOTES
I spoke to patient again and discussed option of continued IV antibiotics and observation for resolution or persistence/progression and need for incision and drainage, versus incision and drainage in the OR or at the bedside which would result in overnight stay, versus needle drainage of the abscess at the bedside. She stated her desire for needle drainage at the bedside and her understanding that she may be able to be discharged after the needle drainage with close outpatient follow up. PLAN:    Needle aspiration and drainage of right peritonsillar abscess or space. Patient was counseled for the procedure, which was described. I advised of the expected outcome and potential benefits of surgery, i.e. resolution of abscess/infection. I advised that certain expected conditions may occur after this procedure, usually temporary, including, but not limited to: bleeding, swelling of neck, drainage, pain and discomfort, sorethroat, hoarseness, and change in voice. I advised of the risks and potential complications,  including, but not limited to, the following:  excessive bleeding/hemorrhage, infection, persistence of any of the above expected conditions, recurrence or persistence of abscess, airway obstruction, need for artificial airway such as endotracheal tube or tracheotomy, adverse reaction to the local anesthetic, including cardiac arrest, stroke, heart attack, and death. I advised of the alternatives of therapy, including, but not limited to:   Further observation and medical treatment, with risk of worsening or spread of infection, airway obstruction, and death, versus incision and drainage in the OR under general anesthesia or at the bedside. I advised of the potential risk, possible consequences, and adverse effects of not undergoing the surgery:  Fatal airway obstruction.  Unresolved infection which could spread and lead to a life-threatening infection, such as a deep neck infection or sepsis

## 2020-09-13 LAB
EKG ATRIAL RATE: 226 BPM
EKG DIAGNOSIS: NORMAL
EKG P AXIS: 83 DEGREES
EKG Q-T INTERVAL: 318 MS
EKG QRS DURATION: 84 MS
EKG QTC CALCULATION (BAZETT): 436 MS
EKG R AXIS: 71 DEGREES
EKG T AXIS: 12 DEGREES
EKG VENTRICULAR RATE: 113 BPM

## 2020-09-13 PROCEDURE — 93010 ELECTROCARDIOGRAM REPORT: CPT | Performed by: INTERNAL MEDICINE

## 2020-09-13 NOTE — PROGRESS NOTES
Pt discharged to home accompanied by family. Verbalized understanding of all discharge instructions and need for follow-up appointment. Pt states she will schedule her own follow-up appointment if. Pt VU to call Dr. Nhung Amaro office if their are any complications. Pt VU of medications for which she received prescriptions. Pt VSS pt able to swallow without pain.  Stacy Mcwilliams RN

## 2020-09-13 NOTE — DISCHARGE SUMMARY
Status: FinalAnion Gap                                     Date: 09/12/2020Value: 13          Ref range: 3 - 16             Status: FinalGlucose                                       Date: 09/12/2020Value: 122*        Ref range: 70 - 99 mg/dL      Status: FinalBUN                                           Date: 09/12/2020Value: 8           Ref range: 7 - 20 mg/dL       Status: FinalCREATININE                                    Date: 09/12/2020Value: <0.5*       Ref range: 0.6 - 1.1 mg/dL    Status: FinalGFR Non-                      Date: 09/12/2020Value: >60         Ref range: >60                Status: Final              Comment: >60 mL/min/1.73m2 EGFR, calc. for ages 25 and older using theMDRD formula (not corrected for weight), is valid for stablerenal function. GFR                           Date: 09/12/2020Value: >60         Ref range: >60                Status: Final              Comment: Chronic Kidney Disease: less than 60 ml/min/1.73 sq.m. Kidney Failure: less than 15 ml/min/1.73 sq. m. Results valid for patients 18 years and older. Calcium                                       Date: 09/12/2020Value: 9.5         Ref range: 8.3 - 10.6 mg/dL   Status: [facility]                                           Date: 09/12/2020Value: 9.2         Ref range: 4.0 - 11.0 K/uL    Status: FinalRBC                                           Date: 09/12/2020Value: 4.93        Ref range: 4.00 - 5.20 M/uL   Status: FinalHemoglobin                                    Date: 09/12/2020Value: 13.4        Ref range: 12.0 - 16.0 g/dL   Status: FinalHematocrit                                    Date: 09/12/2020Value: 40.8        Ref range: 36.0 - 48.0 %      Status: FinalMCV                                           Date: 09/12/2020Value: 82.9        Ref range: 80.0 - 100.0 fL    Status: [address]                                           Date: 09/12/2020Value: 27.2        Ref range: 26.0 - 34.0 pg Status: [address]                                          Date: 09/12/2020Value: 32.8        Ref range: 31.0 - 36.0 g/dL   Status: FinalRDW                                           Date: 09/12/2020Value: 13.9        Ref range: 12.4 - 15.4 %      Status: FinalPlatelets                                     Date: 09/12/2020Value: 235         Ref range: 135 - 450 K/uL     Status: FinalMPV                                           Date: 09/12/2020Value: 9.2         Ref range: 5.0 - 10.5 fL      Status: FinalNeutrophils %                                 Date: 09/12/2020Value: 88.6        Ref range: %                  Status: FinalLymphocytes %                                 Date: 09/12/2020Value: 9.3         Ref range: %                  Status: FinalMonocytes %                                   Date: 09/12/2020Value: 1.6         Ref range: %                  Status: FinalEosinophils %                                 Date: 09/12/2020Value: 0.0         Ref range: %                  Status: FinalBasophils %                                   Date: 09/12/2020Value: 0.5         Ref range: %                  Status: FinalNeutrophils Absolute                          Date: 09/12/2020Value: 8.2*        Ref range: 1.7 - 7.7 K/uL     Status: FinalLymphocytes Absolute                          Date: 09/12/2020Value: 0.9*        Ref range: 1.0 - 5.1 K/uL     Status: FinalMonocytes Absolute                            Date: 09/12/2020Value: 0.1         Ref range: 0.0 - 1.3 K/uL     Status: FinalEosinophils Absolute                          Date: 09/12/2020Value: 0.0         Ref range: 0.0 - 0.6 K/uL     Status: FinalBasophils Absolute                            Date: 09/12/2020Value: 0.0         Ref range: 0.0 - 0.2 K/uL     Status: FinalMagnesium                                     Date: 09/12/2020Value: 2.50*       Ref range: 1.80 - 2.40 mg/dL  Status: FinalWOUND/ABSCESS                                 Date: 09/12/2020Value: No growth 60 to 72 hours                     Status: FinalGram Stain Result                             Date: 09/12/2020Value:               Status: Final                 Value:4+ WBC's (Polymorphonuclear)No organisms seen------------    Radiology last 7 days:  No results found. [unfilled]    Discharge Medications    Discharge Medication List as of 9/12/2020  7:53 PMSTART taking these medicationsamoxicillin-clavulanate (AUGMENTIN XR) 1000-62.5 MG per extended release tabletTake 2 tablets by mouth 2 times daily for 10 days, Disp-40 tablet,R-0PrintHYDROcodone-acetaminophen (NORCO) 5-325 MG per tabletTake 1 tablet by mouth every 4 hours as needed for Pain for up to 5 days. Intended supply: 6 days. Take lowest dose possible to manage pain, Disp-30 tablet,R-0Print    Discharge Medication List as of 9/12/2020  7:53 PM    Discharge Medication List as of 9/12/2020  7:53 PM    Discharge Medication List as of 9/12/2020  7:53 PMSTOP taking these medicationsamoxicillin (AMOXIL) 500 MG capsuleComments:Reason for Stopping:    Time Spent on Discharge:1E] minutes were spent in patient examination, evaluation, counseling as well as medication reconciliation, prescriptions for required medications, discharge plan, and follow up.     Electronically signed by Mireya Tony MD on 9/28/20 at 10:30 AM EDT

## 2020-09-15 LAB
BLOOD CULTURE, ROUTINE: NORMAL
CULTURE, BLOOD 2: NORMAL
GRAM STAIN RESULT: NORMAL
WOUND/ABSCESS: NORMAL

## 2021-05-06 ENCOUNTER — HOSPITAL ENCOUNTER (OUTPATIENT)
Dept: ULTRASOUND IMAGING | Age: 26
Discharge: HOME | End: 2021-05-06
Payer: COMMERCIAL

## 2021-05-06 DIAGNOSIS — Z32.01 PREGNANCY EXAMINATION OR TEST, POSITIVE RESULT: ICD-10-CM

## 2021-05-06 PROCEDURE — 76801 OB US < 14 WKS SINGLE FETUS: CPT

## 2021-05-20 LAB
HEP B, EXTERNAL RESULT: NEGATIVE
HIV, EXTERNAL RESULT: NORMAL
RPR, EXTERNAL RESULT: NORMAL
RUBELLA TITER, EXTERNAL RESULT: NORMAL

## 2021-07-26 ENCOUNTER — HOSPITAL ENCOUNTER (OUTPATIENT)
Dept: ULTRASOUND IMAGING | Age: 26
Discharge: HOME | End: 2021-07-26
Payer: COMMERCIAL

## 2021-07-26 DIAGNOSIS — Z3A.18 18 WEEKS GESTATION OF PREGNANCY: ICD-10-CM

## 2021-07-26 PROCEDURE — 76805 OB US >/= 14 WKS SNGL FETUS: CPT

## 2021-08-27 ENCOUNTER — INITIAL PRENATAL (OUTPATIENT)
Dept: OBGYN CLINIC | Age: 26
End: 2021-08-27

## 2021-08-27 VITALS
DIASTOLIC BLOOD PRESSURE: 78 MMHG | SYSTOLIC BLOOD PRESSURE: 120 MMHG | BODY MASS INDEX: 20.95 KG/M2 | HEART RATE: 69 BPM | WEIGHT: 129.8 LBS

## 2021-08-27 DIAGNOSIS — O23.41 UTI IN PREGNANCY, ANTEPARTUM, FIRST TRIMESTER: ICD-10-CM

## 2021-08-27 DIAGNOSIS — Z34.82 PRENATAL CARE, SUBSEQUENT PREGNANCY IN SECOND TRIMESTER: Primary | ICD-10-CM

## 2021-08-27 PROCEDURE — 0500F INITIAL PRENATAL CARE VISIT: CPT | Performed by: OBSTETRICS & GYNECOLOGY

## 2021-08-27 ASSESSMENT — PATIENT HEALTH QUESTIONNAIRE - PHQ9
SUM OF ALL RESPONSES TO PHQ QUESTIONS 1-9: 0
1. LITTLE INTEREST OR PLEASURE IN DOING THINGS: 0
SUM OF ALL RESPONSES TO PHQ9 QUESTIONS 1 & 2: 0
2. FEELING DOWN, DEPRESSED OR HOPELESS: 0

## 2021-08-27 NOTE — PROGRESS NOTES
Maternal emotional well being screening form completed and reviewed with patient. Current score is {NUMBERS 5.    Temp.98.2

## 2021-08-27 NOTE — PROGRESS NOTES
Initial Prenatal Visit      CC:   Chief Complaint   Patient presents with    Initial Prenatal Visit          HPI: Hailey Evans is a 26 y.o. at 24w2d who presents for initial prenatal visit. Patient is doing well today without complaints. Patient is a transfer of care from Dr. Fabricio Pham. Current pregnancy complicated by first trimester UTI.     +FM. Denies pelvic pressure, cramping or contractions. Denies vaginal bleeding, loss of fluid. Denies headache, vision changes, RUQ pain, increased LE edema. Denies chest pain, shortness of breath, fever, chills, nausea, vomiting. Denies significant past medical or surgical history. Obstetric history significant for uncomplicated . Review of Systems: The following ROS was otherwise negative, except as noted in the HPI: constitutional, HEENT, respiratory, cardiovascular, gastrointestinal, genitourinary, skin, musculoskeletal, neurological, psych    Obstetrical History:  OB History        2    Para   1    Term   1       0    AB   0    Living   1       SAB   0    TAB   0    Ectopic   0    Molar   0    Multiple   0    Live Births   1                Past Medical History:   History reviewed. No pertinent past medical history. Medications:  Prior to Admission medications    Medication Sig Start Date End Date Taking? Authorizing Provider   Prenatal Multivit-Min-Fe-FA (PRENATAL 1 + IRON PO) Take 1 tablet by mouth daily   Yes Historical Provider, MD        Allergies:  Patient has no known allergies. Surgical History:  History reviewed. No pertinent surgical history.     Family History:  Family History   Problem Relation Age of Onset    High Blood Pressure Maternal Grandmother     Heart Disease Maternal Grandfather     Stroke Maternal Grandfather        Social History:  Social History     Socioeconomic History    Marital status: Single     Spouse name: None    Number of children: None    Years of education: None    Highest education level: None   Occupational History    None   Tobacco Use    Smoking status: Never Smoker    Smokeless tobacco: Never Used   Vaping Use    Vaping Use: Never assessed   Substance and Sexual Activity    Alcohol use: No    Drug use: No    Sexual activity: Yes     Partners: Male   Other Topics Concern    None   Social History Narrative    None     Social Determinants of Health     Financial Resource Strain:     Difficulty of Paying Living Expenses:    Food Insecurity:     Worried About Running Out of Food in the Last Year:     Ran Out of Food in the Last Year:    Transportation Needs:     Lack of Transportation (Medical):  Lack of Transportation (Non-Medical):    Physical Activity:     Days of Exercise per Week:     Minutes of Exercise per Session:    Stress:     Feeling of Stress :    Social Connections:     Frequency of Communication with Friends and Family:     Frequency of Social Gatherings with Friends and Family:     Attends Rastafarian Services:     Active Member of Clubs or Organizations:     Attends Club or Organization Meetings:     Marital Status:    Intimate Partner Violence:     Fear of Current or Ex-Partner:     Emotionally Abused:     Physically Abused:     Sexually Abused:        Physical Exam:  /78   Pulse 69   Wt 129 lb 12.8 oz (58.9 kg)   LMP 03/10/2021   BMI 20.95 kg/m²     Physical Exam  Vitals reviewed. Constitutional:       General: She is not in acute distress. Appearance: She is well-developed. HENT:      Head: Normocephalic and atraumatic. Eyes:      Conjunctiva/sclera: Conjunctivae normal.   Cardiovascular:      Rate and Rhythm: Normal rate. Pulmonary:      Effort: Pulmonary effort is normal. No respiratory distress. Abdominal:      General: There is no distension. Palpations: Abdomen is soft. Tenderness: There is no abdominal tenderness. There is no guarding or rebound. Musculoskeletal:         General: No swelling.    Skin: General: Skin is warm and dry. Neurological:      Mental Status: She is alert and oriented to person, place, and time. Psychiatric:         Mood and Affect: Mood normal.         Behavior: Behavior normal.         Thought Content: Thought content normal.          FHT: 156 bpm via doppler    Assessment/Plan:   Damon Martin is a 26 y.o.  at 24w2d who presents for initial prenatal visit. 1. Prenatal care, subsequent pregnancy in second trimester     - Patient is doing well today without complaints     - Fetal wellbeing reassuring by FH and FHR     - Maternal wellness questionnaire reviewed - no concerns today, score 5     - PNL: O pos/ab neg, R Immune, HepB non-reactive, HepC non-reactive, HIV non-reactive, Syphilis non-reactive, Hgb 13.3, Plt 251, UCx E. coli, Pap NILM 2021     - Aneuploidy screen: Declined     - Carrier screen: Neg in 2017     - Anatomy: Anterior placenta, no previa, ANASTASIA wnl, EFW 29th %tile, no gross anomalies     - Pregnancy physiology and precautions reviewed     - Return precautions reviewed      - Will follow-up in 4 weeks for FRANCIS visit and glucose screen     2.  UTI in pregnancy, antepartum, first trimester     - Urine culture positive for E. coli     - Treated with Macorbid without difficulty     Mane Haji DO

## 2021-09-12 PROBLEM — Z3A.40 40 WEEKS GESTATION OF PREGNANCY: Status: RESOLVED | Noted: 2017-10-24 | Resolved: 2021-09-12

## 2021-09-12 PROBLEM — O99.013 MATERNAL ANEMIA, ANTEPARTUM, THIRD TRIMESTER: Status: RESOLVED | Noted: 2017-10-24 | Resolved: 2021-09-12

## 2021-09-12 PROBLEM — Z34.03 SUPERVISION OF LOW-RISK FIRST PREGNANCY, THIRD TRIMESTER: Status: RESOLVED | Noted: 2017-10-24 | Resolved: 2021-09-12

## 2021-09-12 PROBLEM — Z34.82 PRENATAL CARE, SUBSEQUENT PREGNANCY IN SECOND TRIMESTER: Status: ACTIVE | Noted: 2021-09-12

## 2021-09-12 PROBLEM — O48.0 POST TERM PREGNANCY OVER 40 WEEKS: Status: RESOLVED | Noted: 2017-10-24 | Resolved: 2021-09-12

## 2021-09-12 PROBLEM — O23.41 UTI IN PREGNANCY, ANTEPARTUM, FIRST TRIMESTER: Status: ACTIVE | Noted: 2021-09-12

## 2021-09-24 ENCOUNTER — ROUTINE PRENATAL (OUTPATIENT)
Dept: OBGYN CLINIC | Age: 26
End: 2021-09-24
Payer: COMMERCIAL

## 2021-09-24 VITALS
WEIGHT: 135.8 LBS | BODY MASS INDEX: 21.92 KG/M2 | DIASTOLIC BLOOD PRESSURE: 84 MMHG | HEART RATE: 109 BPM | SYSTOLIC BLOOD PRESSURE: 130 MMHG

## 2021-09-24 DIAGNOSIS — Z23 NEED FOR TDAP VACCINATION: ICD-10-CM

## 2021-09-24 DIAGNOSIS — Z34.93 PRENATAL CARE, THIRD TRIMESTER: Primary | ICD-10-CM

## 2021-09-24 DIAGNOSIS — Z34.91 PREGNANCY WITH PRENATAL CARE ELSEWHERE IN FIRST TRIMESTER: ICD-10-CM

## 2021-09-24 DIAGNOSIS — O23.41 UTI IN PREGNANCY, ANTEPARTUM, FIRST TRIMESTER: ICD-10-CM

## 2021-09-24 DIAGNOSIS — D22.9: ICD-10-CM

## 2021-09-24 PROCEDURE — 0502F SUBSEQUENT PRENATAL CARE: CPT | Performed by: OBSTETRICS & GYNECOLOGY

## 2021-09-24 PROCEDURE — 36415 COLL VENOUS BLD VENIPUNCTURE: CPT | Performed by: OBSTETRICS & GYNECOLOGY

## 2021-09-24 NOTE — PROGRESS NOTES
5:05 PM Given Tdap (Adacel) vaccine 0.5mL IM    Site:Left deltoid. Lot # [id number]  Expiration Date: 05/29/2023  [facility] # [id number]  Patient tolerated well. No reaction noted after 20 minutes. Administered by:  Marianne DAVIS

## 2021-09-24 NOTE — PROGRESS NOTES
.eastobgynglucose    Patient started drink at 356p  and finished at 359   . Patient tolerated drink well.   1 green tube drawn at 459 . # 50 grams of Glucola. No complain of nausea and vomiting at this time, will continue to monitor.

## 2021-09-24 NOTE — PROGRESS NOTES
26 y.o. Angie Arauz at 28w2d EGA Estimated Date of Delivery: 12/15/21 here for FRANCIS:     Pt seen and examined. No concerns/complaints. Does admit to an large mole on the back of her neck / right side. Has increased in size since pregnancy -- denies bleeding / discharge / ulceration / pain. Does not follow with a dermatologist.   Denies VB, LOF, CTX. Endorses (+) FM. Denies fevers / chills / chest pain / shortness of breath. Denies HA, changes with vision, RUQ pain, edema. MWQ reviewed (Score: 3). 28 wk labs and TDAP today. Objective:  /84   Pulse 109   Wt 135 lb 12.8 oz (61.6 kg)   LMP 03/10/2021   BMI 21.92 kg/m²   Gen: AO, NAD  Abd: Soft, NT, gravid   Physical Exam  Neck:         Ext: Mild LE edema  OMM: Increased lumbar lordosis    Assessment/Plan:   Diagnosis Orders   1. Prenatal care, third trimester  GLUCOSE CHALLENGE GESTATIONAL    CBC Auto Differential   2. Enlarged nevus  Rai Justin MD, Dermatology, [facility]   3. Need for Tdap vaccination  Tetanus-Diphth-Acell Pertussis (BOOSTRIX) injection 0.5 mL   4. UTI in pregnancy, antepartum, first trimester     5. Pregnancy with prenatal care elsewhere in first trimester        - reassuring maternal / fetal status   - suspect pyogenic granuloma on neck -- refer to derm for removal   - 28 wk labs and TDAP today     Follow Up  Return OB precautions reviewed   Return in about 2 weeks (around 10/8/2021) for Return OB visit. Approximately 20 minutes spent in room counseling patient on condition and coordination of care with over 50% in direct face to face counseling.      Pamela Pisano DO

## 2021-09-25 LAB
BASOPHILS ABSOLUTE: 0 K/UL (ref 0–0.2)
BASOPHILS RELATIVE PERCENT: 0.1 %
EOSINOPHILS ABSOLUTE: 0.1 K/UL (ref 0–0.6)
EOSINOPHILS RELATIVE PERCENT: 0.5 %
GLUCOSE CHALLENGE: 130 MG/DL
HCT VFR BLD CALC: 34.9 % (ref 36–48)
HEMOGLOBIN: 11.2 G/DL (ref 12–16)
LYMPHOCYTES ABSOLUTE: 2.5 K/UL (ref 1–5.1)
LYMPHOCYTES RELATIVE PERCENT: 19.9 %
MCH RBC QN AUTO: 27 PG (ref 26–34)
MCHC RBC AUTO-ENTMCNC: 32.2 G/DL (ref 31–36)
MCV RBC AUTO: 84 FL (ref 80–100)
MONOCYTES ABSOLUTE: 0.6 K/UL (ref 0–1.3)
MONOCYTES RELATIVE PERCENT: 5.2 %
NEUTROPHILS ABSOLUTE: 9.2 K/UL (ref 1.7–7.7)
NEUTROPHILS RELATIVE PERCENT: 74.3 %
PDW BLD-RTO: 13.7 % (ref 12.4–15.4)
PLATELET # BLD: 202 K/UL (ref 135–450)
PMV BLD AUTO: 9.5 FL (ref 5–10.5)
RBC # BLD: 4.16 M/UL (ref 4–5.2)
WBC # BLD: 12.4 K/UL (ref 4–11)

## 2021-10-05 ENCOUNTER — NURSE ONLY (OUTPATIENT)
Dept: OBGYN CLINIC | Age: 26
End: 2021-10-05
Payer: COMMERCIAL

## 2021-10-05 VITALS
BODY MASS INDEX: 21.95 KG/M2 | SYSTOLIC BLOOD PRESSURE: 112 MMHG | TEMPERATURE: 97.7 F | HEART RATE: 84 BPM | WEIGHT: 136 LBS | DIASTOLIC BLOOD PRESSURE: 78 MMHG

## 2021-10-05 DIAGNOSIS — O99.810 ABNORMAL GLUCOSE AFFECTING PREGNANCY: Primary | ICD-10-CM

## 2021-10-05 LAB
GLUCOSE FASTING: 60 MG/DL
GLUCOSE TOLERANCE TEST 1 HOUR: 135 MG/DL
GLUCOSE TOLERANCE TEST 2 HOUR: 83 MG/DL
GLUCOSE TOLERANCE TEST 3 HOUR: 95 MG/DL

## 2021-10-05 PROCEDURE — 36415 COLL VENOUS BLD VENIPUNCTURE: CPT | Performed by: OBSTETRICS & GYNECOLOGY

## 2021-10-05 NOTE — PROGRESS NOTES
Patient presented to the office fasting since 11pm 10/4/21.    9:49am fasting Blood draw from L Cephalic x 1 attempt without difficulty. 1 PST tubes drawn. Patient tolerated well.  Cherelle Frederick MA    100mg glucola given at 9:53am, finished at 9:55am

## 2021-10-07 ENCOUNTER — ROUTINE PRENATAL (OUTPATIENT)
Dept: OBGYN CLINIC | Age: 26
End: 2021-10-07

## 2021-10-07 VITALS
DIASTOLIC BLOOD PRESSURE: 80 MMHG | SYSTOLIC BLOOD PRESSURE: 116 MMHG | BODY MASS INDEX: 22.47 KG/M2 | HEART RATE: 111 BPM | WEIGHT: 139.2 LBS

## 2021-10-07 DIAGNOSIS — O23.41 UTI IN PREGNANCY, ANTEPARTUM, FIRST TRIMESTER: ICD-10-CM

## 2021-10-07 DIAGNOSIS — Z34.82 PRENATAL CARE, SUBSEQUENT PREGNANCY IN SECOND TRIMESTER: Primary | ICD-10-CM

## 2021-10-07 PROCEDURE — 0502F SUBSEQUENT PRENATAL CARE: CPT | Performed by: OBSTETRICS & GYNECOLOGY

## 2021-10-07 NOTE — PROGRESS NOTES
Return OB Office Visit    CC:   Chief Complaint   Patient presents with    Routine Prenatal Visit       HPI:  Pt seen and examined. No concerns/complaints. Denies VB, LOF, ctx. +FM. Has had some pelvic pressure. No dysuria or hematuria. Maternal wellness questionnaire reviewed - no concerns today. Score 5. Objective:  /80   Pulse 111   Wt 139 lb 3.2 oz (63.1 kg)   LMP 03/10/2021   BMI 22.47 kg/m²   Gen: AO, NAD  Abd: Soft, NT  FHT: 144  FH: 30cm, vertex by Leopolds  Ext: Mild LE edema    Assessment/Plan:  23 y.o.  at 30w1d (Estimated Date of Delivery: 12/15/21) presents for FRANCIS appointment:      Diagnosis Orders   1. Prenatal care, subsequent pregnancy in second trimester     2.  UTI in pregnancy, antepartum, first trimester       Doing well today, routine care   - FWB reasusring   - s/p 3T labs (abnl 1hr, 3hr wnl)   - Tdap given      Dispo: RTC in 2 weeks  Eduin Ibarra MD

## 2021-10-07 NOTE — PROGRESS NOTES
Temp-98.1f Infrared  Maternal emotional well being screening form completed and reviewed with patient. Current score is 5. Patient given referral to [address] [place] [address] ([phone]):  No

## 2021-10-27 ENCOUNTER — ROUTINE PRENATAL (OUTPATIENT)
Dept: OBGYN CLINIC | Age: 26
End: 2021-10-27

## 2021-10-27 VITALS
HEART RATE: 91 BPM | SYSTOLIC BLOOD PRESSURE: 120 MMHG | DIASTOLIC BLOOD PRESSURE: 70 MMHG | BODY MASS INDEX: 22.66 KG/M2 | WEIGHT: 140.4 LBS

## 2021-10-27 DIAGNOSIS — Z34.83 PRENATAL CARE, SUBSEQUENT PREGNANCY IN THIRD TRIMESTER: Primary | ICD-10-CM

## 2021-10-27 DIAGNOSIS — L98.9 SKIN LESION: ICD-10-CM

## 2021-10-27 DIAGNOSIS — O23.41 UTI IN PREGNANCY, ANTEPARTUM, FIRST TRIMESTER: ICD-10-CM

## 2021-10-27 PROCEDURE — 0502F SUBSEQUENT PRENATAL CARE: CPT | Performed by: OBSTETRICS & GYNECOLOGY

## 2021-10-27 NOTE — PROGRESS NOTES
Temp-97.7 infrared    Maternal emotional well being screening form completed and reviewed with patient. Current score is 2. Patient given referral to [address] [place] [address] ([phone]):  No

## 2021-10-28 ENCOUNTER — TELEPHONE (OUTPATIENT)
Dept: OBGYN CLINIC | Age: 26
End: 2021-10-28

## 2021-10-28 DIAGNOSIS — L98.9 SKIN LESION: Primary | ICD-10-CM

## 2021-10-28 NOTE — TELEPHONE ENCOUNTER
Pended order for Dermatology referral to [facility]. Please sign and I will attempt to call, schedule and send clinicals.

## 2021-11-01 NOTE — TELEPHONE ENCOUNTER
Pt is scheduled for Monday 11/22/21 at 2:00pm at the [facility] dermatology office. [address]. LM for pt to call the office to give above information.

## 2021-11-09 NOTE — PROGRESS NOTES
Return OB Office Visit    CC:   Chief Complaint   Patient presents with    Routine Prenatal Visit       HPI:  Patient seen and examined. Patient is doing well without complaints. Denies VB, LOF, ctx. +Fm. Denies headaches, vision changes, RUQ pain, increased LE edema. Denies chest pain, shortness of breath, fever, chills, nausea, vomiting. Has a referral for Dermatology for lesion removal, however has not been was offered an appointment next year. Review of Systems: The following ROS was otherwise negative, except as noted in the HPI: constitutional, HEENT, respiratory, cardiovascular, gastrointestinal, genitourinary, skin, musculoskeletal, neurological, psych    Objective:  /70   Pulse 91   Wt 140 lb 6.4 oz (63.7 kg)   LMP 03/10/2021   BMI 22.66 kg/m²     Physical Exam  Vitals reviewed. Constitutional:       General: She is not in acute distress. Appearance: She is well-developed. HENT:      Head: Normocephalic and atraumatic. Eyes:      Conjunctiva/sclera: Conjunctivae normal.   Cardiovascular:      Rate and Rhythm: Normal rate. Pulmonary:      Effort: Pulmonary effort is normal. No respiratory distress. Abdominal:      General: There is no distension. Palpations: Abdomen is soft. Tenderness: There is no abdominal tenderness. There is no guarding or rebound. Musculoskeletal:         General: No swelling. Skin:     General: Skin is warm and dry. Neurological:      Mental Status: She is alert and oriented to person, place, and time. Psychiatric:         Mood and Affect: Mood normal.         Behavior: Behavior normal.         Thought Content: Thought content normal.       FHR: 137 bpm by doppler    Assessment/Plan:   Esperanza Sanchez is a 23 y.o.  at 33w0d who presents for routine OB visit  1.  Prenatal care, subsequent pregnancy in third trimester     - Patient is doing well today without complaints     - Fetal wellbeing reassuring by FH and FHR today - Maternal wellness questionnaire reviewed - no concerns today, score 2     - Labor, decreased FM, VB, LOF precautions reviewed     - Return precautions reviewed      - Will follow-up in 2 weeks for FRANCIS visit     2. UTI in pregnancy, antepartum, first trimester    3.  Skin lesion     - Referral placed to alternative Dermatology office today for earlier visit      Jania Suazo DO

## 2021-11-10 ENCOUNTER — ROUTINE PRENATAL (OUTPATIENT)
Dept: OBGYN CLINIC | Age: 26
End: 2021-11-10
Payer: COMMERCIAL

## 2021-11-10 VITALS
HEART RATE: 101 BPM | WEIGHT: 142.8 LBS | DIASTOLIC BLOOD PRESSURE: 80 MMHG | BODY MASS INDEX: 23.05 KG/M2 | SYSTOLIC BLOOD PRESSURE: 120 MMHG

## 2021-11-10 DIAGNOSIS — L98.9 SKIN LESION: ICD-10-CM

## 2021-11-10 DIAGNOSIS — Z34.83 PRENATAL CARE, SUBSEQUENT PREGNANCY IN THIRD TRIMESTER: Primary | ICD-10-CM

## 2021-11-10 DIAGNOSIS — O23.41 UTI IN PREGNANCY, ANTEPARTUM, FIRST TRIMESTER: ICD-10-CM

## 2021-11-10 PROCEDURE — 59425 ANTEPARTUM CARE ONLY: CPT | Performed by: OBSTETRICS & GYNECOLOGY

## 2021-11-10 NOTE — PROGRESS NOTES
Temp-97.9 infrared    Maternal emotional well being screening form completed and reviewed with patient. Current score is 1. Patient given referral to [address] [place] [address] ([phone]):  No

## 2021-11-10 NOTE — PROGRESS NOTES
Return OB Office Visit    CC:   Chief Complaint   Patient presents with    Routine Prenatal Visit       HPI:  Patient seen and examined. Patient is doing well without complaints. Denies VB, LOF, ctx. +FM. Denies headaches, vision changes, RUQ pain, increased LE edema. Denies chest pain, shortness of breath, fever, chills, nausea, vomiting. Review of Systems: The following ROS was otherwise negative, except as noted in the HPI: constitutional, HEENT, respiratory, cardiovascular, gastrointestinal, genitourinary, skin, musculoskeletal, neurological, psych    Objective:  /80   Pulse 101   Wt 142 lb 12.8 oz (64.8 kg)   LMP 03/10/2021   BMI 23.05 kg/m²     Physical Exam  Vitals reviewed. Constitutional:       General: She is not in acute distress. Appearance: She is well-developed. HENT:      Head: Normocephalic and atraumatic. Eyes:      Conjunctiva/sclera: Conjunctivae normal.   Cardiovascular:      Rate and Rhythm: Normal rate. Pulmonary:      Effort: Pulmonary effort is normal. No respiratory distress. Abdominal:      General: There is no distension. Palpations: Abdomen is soft. Tenderness: There is no abdominal tenderness. There is no guarding or rebound. Musculoskeletal:         General: No swelling. Skin:     General: Skin is warm and dry. Neurological:      Mental Status: She is alert and oriented to person, place, and time. Psychiatric:         Mood and Affect: Mood normal.         Behavior: Behavior normal.         Thought Content: Thought content normal.       FHR: 158 bpm by doppler    Assessment/Plan:   Morgan Posada is a 23 y.o. Eneida Mace at 35w0d who presents for routine OB visit  1.  Prenatal care, subsequent pregnancy in third trimester     - Patient is doing well today without complaints     - Fetal wellbeing reassuring by FH and FHR today     - Maternal wellness questionnaire reviewed - no concerns today, score 1     - Labor, decreased FM, VB, LOF precautions reviewed     - Return precautions reviewed      - Will follow-up in 1 weeks for FRANCIS visit     2. UTI in pregnancy, antepartum, first trimester    3.  Skin lesion     - Has appointment scheduled later this month      Lasehll Yeh DO

## 2021-11-22 ENCOUNTER — OFFICE VISIT (OUTPATIENT)
Dept: DERMATOLOGY | Age: 26
End: 2021-11-22
Payer: COMMERCIAL

## 2021-11-22 VITALS — TEMPERATURE: 98.1 F

## 2021-11-22 DIAGNOSIS — D22.9 MULTIPLE BENIGN MELANOCYTIC NEVI: ICD-10-CM

## 2021-11-22 DIAGNOSIS — D22.4 MELANOCYTIC NEVUS OF NECK: Primary | ICD-10-CM

## 2021-11-22 PROCEDURE — 99203 OFFICE O/P NEW LOW 30 MIN: CPT | Performed by: DERMATOLOGY

## 2021-11-22 NOTE — Clinical Note
Thank you for referring Ms. Kaycee So. The lesion appears to be a benign compound nevus. Will perform a shave removal following delivery. Thank you and have a happy Thanksgiving!    Delia

## 2021-11-22 NOTE — PROGRESS NOTES
Patient's Name: Jocelyne Carr  MRN: [id number]  YOB: 1995  Date of Visit: 11/22/2021  Primary Care Provider: No primary care provider on file. Referring Provider: Teresa Lewis, *    Subjective:     Chief Complaint   Patient presents with    New Patient     Patient was referred by [facility] for a mole of concern on her right neck that has enlarged since pregnancy. History of Present Illness:  Jocelyne Carr an 23 y.o. female who presents in clinic today as a new patient seen in consultation request by Teresa Lewis, * for evaluation of moles        She is concerned about the following spot/s that she would like checked:  What is it: changing mole  Signs and symptoms: enlarging  Location: back of neck  Severity: Severity now is 8/10. Modifying factors: Things that make this condition worse are none (pregnancy). Things that make this condition better are none. Duration: This lesion has been present for several years. Current treatment: None  Previous treatment: None     Patient reports 10 years ago having a mole removed from the scalp, came back as benign. Many year history of multiple nevi on the trunk and extremities. Denies any recent new lesions. Denies any changes in size, color or shape. Denies any associated pain, pruritus or bleeding.       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 Patient denies  a family history of abnormal moles  Patient denies  an immediate family history of melanoma. Past Medical History:  History reviewed. No pertinent past medical history. Past Surgical History:  History reviewed. No pertinent surgical history. Past Family History:  Family History   Problem Relation Age of Onset    High Blood Pressure Maternal Grandmother     Heart Disease Maternal Grandfather     Stroke Maternal Grandfather        Allergies:  No Known Allergies    Current Medications:  Current Outpatient Medications   Medication Sig Dispense Refill    Prenatal Multivit-Min-Fe-FA (PRENATAL 1 + IRON PO) Take 1 tablet by mouth daily       No current facility-administered medications for this visit. Review of Systems:  Constitutional: No fevers, chills or recent illness. Skin: Skin:As per HPI AND otherwise no new, bleeding or symptomatic skin lesions      Objective:     Vitals:    11/22/21 1408   Temp: 98.1 °F (36.7 °C)   TempSrc: Infrared     Physical Examination:  General: alert, comfortable, no apparent distress, well-appearing  Psych: alert, oriented and pleasant  Neuro: oriented to person, place, and time  Skin: Areas examined: head including face, lips, conjunctiva and lids, neck, hair/scalp, chest, including breasts and axilla, left hand, right hand and digits and nails      All areas examined were within normal limits except those listed below with the appropriate assessment and plan    Assessment and Plan (with relevant objective exam findings):     1.  Benign melanocytic mole of neck  Location/objective findings: Rt posterior neck with 2 x 2 cm pink to flesh colored mammillated plaque with dispersed brown pigment in concretions  Discussed treatment options with shave removal today or awaiting after delivery. Patient opted to wait and have lesion removed in January 2. Multiple benign melanocytic nevi   Location: Rt clavicle and Lt superior breast  Objective findings: multiple brown/pink macules and papules without abnormal findings or concerning findings on exam and dermatoscopy    -Counseled the patient that these are benign and need no therapy. Observation for any changes recommended     Follow up:  Return visit in 1-2 months or as needed for change in condition. All questions addressed. Procedure:   No procedure performed          I saw your patient Rob Kyle at the date listed above in my Dermatology  clinic in [facility]. Thank you very much for the referral.    My exam findings, assessment and plan can be found in EPIC, I have also attached them below for your convenience. I very much appreciate your referral and the opportunity to participate in this patient's care. Please don't hesitate to contact me with questions or concerns about our visit or management of this patient in the future.      Breann Mcmullen MD, MS

## 2021-11-22 NOTE — LETTER
[facility] Dermatology  [address] [place] [address]  Phone: [phone]  Fax: [phone]           Jm Hernandez MD      November 22, 2021     Patient: Ramsey Reid   MR Number: [id number]   YOB: 1995   Date of Visit: 11/22/2021       Dear Dr. Iman Tim: Thank you for referring Ludivina Koo to me for evaluation/treatment. Below are the relevant portions of my assessment and plan of care. If you have questions, please do not hesitate to call me. I look forward to following An Garcia along with you.     Sincerely,        Jm Hernandez MD    CC providers:  Janet Fritz DO  [id number] [place] [address]

## 2021-11-23 ENCOUNTER — HOSPITAL ENCOUNTER (OUTPATIENT)
Age: 26
Discharge: HOME | End: 2021-11-23
Attending: OBSTETRICS & GYNECOLOGY | Admitting: OBSTETRICS & GYNECOLOGY
Payer: COMMERCIAL

## 2021-11-23 ENCOUNTER — ROUTINE PRENATAL (OUTPATIENT)
Dept: OBGYN CLINIC | Age: 26
End: 2021-11-23

## 2021-11-23 VITALS
HEIGHT: 62 IN | DIASTOLIC BLOOD PRESSURE: 84 MMHG | HEART RATE: 107 BPM | BODY MASS INDEX: 26.87 KG/M2 | WEIGHT: 146 LBS | TEMPERATURE: 98 F | RESPIRATION RATE: 16 BRPM | SYSTOLIC BLOOD PRESSURE: 128 MMHG

## 2021-11-23 VITALS
BODY MASS INDEX: 23.6 KG/M2 | HEART RATE: 111 BPM | DIASTOLIC BLOOD PRESSURE: 106 MMHG | WEIGHT: 146.2 LBS | SYSTOLIC BLOOD PRESSURE: 152 MMHG

## 2021-11-23 DIAGNOSIS — R03.0 ELEVATED BLOOD-PRESSURE READING WITHOUT DIAGNOSIS OF HYPERTENSION: ICD-10-CM

## 2021-11-23 DIAGNOSIS — O09.32 LATE PRENATAL CARE AFFECTING PREGNANCY IN SECOND TRIMESTER: ICD-10-CM

## 2021-11-23 DIAGNOSIS — Z36.9 ANTENATAL SCREENING ENCOUNTER: ICD-10-CM

## 2021-11-23 DIAGNOSIS — Z34.83 PRENATAL CARE, SUBSEQUENT PREGNANCY IN THIRD TRIMESTER: Primary | ICD-10-CM

## 2021-11-23 LAB
A/G RATIO: 1.3 (ref 1.1–2.2)
ALBUMIN SERPL-MCNC: 3.6 G/DL (ref 3.4–5)
ALP BLD-CCNC: 130 U/L (ref 40–129)
ALT SERPL-CCNC: 7 U/L (ref 10–40)
ANION GAP SERPL CALCULATED.3IONS-SCNC: 12 MMOL/L (ref 3–16)
APTT: 29.2 SEC (ref 26.2–38.6)
AST SERPL-CCNC: 19 U/L (ref 15–37)
BASOPHILS ABSOLUTE: 0 K/UL (ref 0–0.2)
BASOPHILS RELATIVE PERCENT: 0.1 %
BILIRUB SERPL-MCNC: <0.2 MG/DL (ref 0–1)
BILIRUBIN URINE: NEGATIVE
BLOOD, URINE: NEGATIVE
BUN BLDV-MCNC: 5 MG/DL (ref 7–20)
CALCIUM SERPL-MCNC: 9.5 MG/DL (ref 8.3–10.6)
CHLORIDE BLD-SCNC: 105 MMOL/L (ref 99–110)
CLARITY: CLEAR
CO2: 21 MMOL/L (ref 21–32)
COLOR: YELLOW
CREAT SERPL-MCNC: 0.6 MG/DL (ref 0.6–1.1)
CREATININE URINE: 45.8 MG/DL (ref 28–259)
EOSINOPHILS ABSOLUTE: 0.1 K/UL (ref 0–0.6)
EOSINOPHILS RELATIVE PERCENT: 0.5 %
FIBRINOGEN: 434 MG/DL (ref 200–397)
GFR AFRICAN AMERICAN: >60
GFR NON-AFRICAN AMERICAN: >60
GLUCOSE BLD-MCNC: 129 MG/DL (ref 70–99)
GLUCOSE URINE: NEGATIVE MG/DL
HCT VFR BLD CALC: 34.8 % (ref 36–48)
HEMOGLOBIN: 11.4 G/DL (ref 12–16)
INR BLD: 0.91 (ref 0.88–1.12)
KETONES, URINE: NEGATIVE MG/DL
LACTATE DEHYDROGENASE: 133 U/L (ref 100–190)
LEUKOCYTE ESTERASE, URINE: NEGATIVE
LYMPHOCYTES ABSOLUTE: 2.3 K/UL (ref 1–5.1)
LYMPHOCYTES RELATIVE PERCENT: 19.8 %
MCH RBC QN AUTO: 26.1 PG (ref 26–34)
MCHC RBC AUTO-ENTMCNC: 32.6 G/DL (ref 31–36)
MCV RBC AUTO: 80 FL (ref 80–100)
MICROSCOPIC EXAMINATION: NORMAL
MONOCYTES ABSOLUTE: 0.7 K/UL (ref 0–1.3)
MONOCYTES RELATIVE PERCENT: 5.9 %
NEUTROPHILS ABSOLUTE: 8.7 K/UL (ref 1.7–7.7)
NEUTROPHILS RELATIVE PERCENT: 73.7 %
NITRITE, URINE: NEGATIVE
PDW BLD-RTO: 13.2 % (ref 12.4–15.4)
PH UA: 7 (ref 5–8)
PLATELET # BLD: 173 K/UL (ref 135–450)
PMV BLD AUTO: 8.5 FL (ref 5–10.5)
POTASSIUM SERPL-SCNC: 3.7 MMOL/L (ref 3.5–5.1)
PROTEIN PROTEIN: 5 MG/DL
PROTEIN UA: NEGATIVE MG/DL
PROTEIN/CREAT RATIO: 0.1 MG/DL
PROTHROMBIN TIME: 10.2 SEC (ref 9.9–12.7)
RBC # BLD: 4.35 M/UL (ref 4–5.2)
SODIUM BLD-SCNC: 138 MMOL/L (ref 136–145)
SPECIFIC GRAVITY UA: 1.01 (ref 1–1.03)
TOTAL PROTEIN: 6.3 G/DL (ref 6.4–8.2)
URIC ACID, SERUM: 3.9 MG/DL (ref 2.6–6)
URINE TYPE: NORMAL
UROBILINOGEN, URINE: 0.2 E.U./DL
WBC # BLD: 11.8 K/UL (ref 4–11)

## 2021-11-23 PROCEDURE — 99235 HOSP IP/OBS SAME DATE MOD 70: CPT | Performed by: OBSTETRICS & GYNECOLOGY

## 2021-11-23 PROCEDURE — 99211 OFF/OP EST MAY X REQ PHY/QHP: CPT

## 2021-11-23 PROCEDURE — 85610 PROTHROMBIN TIME: CPT

## 2021-11-23 PROCEDURE — 80053 COMPREHEN METABOLIC PANEL: CPT

## 2021-11-23 PROCEDURE — 0502F SUBSEQUENT PRENATAL CARE: CPT | Performed by: OBSTETRICS & GYNECOLOGY

## 2021-11-23 PROCEDURE — 84550 ASSAY OF BLOOD/URIC ACID: CPT

## 2021-11-23 PROCEDURE — 85384 FIBRINOGEN ACTIVITY: CPT

## 2021-11-23 PROCEDURE — 85025 COMPLETE CBC W/AUTO DIFF WBC: CPT

## 2021-11-23 PROCEDURE — 82570 ASSAY OF URINE CREATININE: CPT

## 2021-11-23 PROCEDURE — 81003 URINALYSIS AUTO W/O SCOPE: CPT

## 2021-11-23 PROCEDURE — 83615 LACTATE (LD) (LDH) ENZYME: CPT

## 2021-11-23 PROCEDURE — 87081 CULTURE SCREEN ONLY: CPT

## 2021-11-23 PROCEDURE — 84156 ASSAY OF PROTEIN URINE: CPT

## 2021-11-23 PROCEDURE — 85730 THROMBOPLASTIN TIME PARTIAL: CPT

## 2021-11-23 NOTE — H&P
Obstetrics Triage History and Physical    CC:   Chief Complaint   Patient presents with    Hypertension     in office, sent for [facility] workup       HPI: Kavita Galicia is a 23 y.o. [place] at 36w6d who presents from office for elevated blood pressures. /96 and 152/106 in the office. Reports a very mild headache, no chest pain/SOB. No RUQ pain or blurry vision. +FM, no VB/LOF. Denies any contractions today. Pregnancy has been complicated by late prenatal care and elevated blood pressure today in office. Review of Systems: The following ROS was otherwise negative, except as noted in the HPI: constitutional, HEENT, respiratory, cardiovascular, gastrointestinal, genitourinary, skin, musculoskeletal, neurological, psych. OBGYN Provider : Manny    Obstetrical History:  OB History    Para Term  AB Living   2 1 1 0 0 1   SAB IAB Ectopic Molar Multiple Live Births   0 0 0 0 0 1      # Outcome Date GA Lbr Shahid/2nd Weight Sex Delivery Anes PTL Lv   2 Current            1 Term 10/24/17 40w4d   F Vag-Spont EPI N KIRK      Name: Sri : 9  Apgar5: 9       Past Medical History:   Past Medical History:   Diagnosis Date    Glucose intolerance of pregnancy     failed 1 hr       Medications:  No current facility-administered medications on file prior to encounter. Current Outpatient Medications on File Prior to Encounter   Medication Sig Dispense Refill    Prenatal Multivit-Min-Fe-FA (PRENATAL 1 + IRON PO) Take 1 tablet by mouth daily         Allergies:  Patient has no known allergies. Surgical History:  History reviewed. No pertinent surgical history.     Family History:  Family History   Problem Relation Age of Onset    High Blood Pressure Maternal Grandmother     Heart Disease Maternal Grandfather     Stroke Maternal Grandfather        Social History:  Social History     Substance and Sexual Activity   Alcohol Use No     Social History     Substance and Sexual Activity   Drug Use No     Social History     Tobacco Use   Smoking Status Never Smoker   Smokeless Tobacco Never Used       Physical Exam:  /83   Pulse 120   Temp 98 °F (36.7 °C) (Oral)   Resp 16   Ht 5' 2\" (1.575 m)   Wt 146 lb (66.2 kg)   LMP 03/10/2021   BMI 26.70 kg/m²   Physical Exam  Constitutional:       Appearance: Normal appearance. HENT:      Head: Normocephalic. Cardiovascular:      Rate and Rhythm: Normal rate and regular rhythm. Pulmonary:      Effort: Pulmonary effort is normal. No respiratory distress. Breath sounds: Normal breath sounds. Abdominal:      Palpations: Abdomen is soft. Tenderness: There is no abdominal tenderness. There is no guarding or rebound. Musculoskeletal:      Right lower leg: No edema. Left lower leg: No edema. Neurological:      General: No focal deficit present. Mental Status: She is alert. Deep Tendon Reflexes: Reflexes normal (1+ bilateral patellar reflexes).    Psychiatric:         Mood and Affect: Mood normal.       Cervix: 1/50/-2, GBS swab collected     Fetal Heart Monitor Interpretation:   FHT: 150 bpm, moderate variability, + accels, no decels  [place]: quiet    Labs:  Admission on 11/23/2021   Component Date Value    Protein, Ur 11/23/2021 5.00     Creatinine, Ur 11/23/2021 45.8     Protein/Creat Ratio 11/23/2021 0.1     Sodium 11/23/2021 138     Potassium 11/23/2021 3.7     Chloride 11/23/2021 105     CO2 11/23/2021 21     Anion Gap 11/23/2021 12     Glucose 11/23/2021 129*    BUN 11/23/2021 5*    CREATININE 11/23/2021 0.6     GFR Non- 11/23/2021 >60     GFR  11/23/2021 >60     Calcium 11/23/2021 9.5     Total Protein 11/23/2021 6.3*    Albumin 11/23/2021 3.6     Albumin/Globulin Ratio 11/23/2021 1.3     Total Bilirubin 11/23/2021 <0.2     Alkaline Phosphatase 11/23/2021 130*    ALT 11/23/2021 7*    AST 11/23/2021 19     WBC 11/23/2021 11.8*    RBC 11/23/2021 4.35     Hemoglobin 2021 11.4*    Hematocrit 2021 34.8*    MCV 2021 80.0     MCH 2021 26.1     MCHC 2021 32.6     RDW 2021 13.2     Platelets  173     MPV 2021 8.5     Neutrophils % 2021 73.7     Lymphocytes % 2021 19.8     Monocytes % 2021 5.9     Eosinophils % 2021 0.5     Basophils % 2021 0.1     Neutrophils Absolute 2021 8.7*    Lymphocytes Absolute 2021 2.3     Monocytes Absolute 2021 0.7     Eosinophils Absolute 2021 0.1     Basophils Absolute 2021 0.0     Protime 2021 10.2     INR 2021 0.91     aPTT 2021 29.2     Fibrinogen 2021 434*    Uric Acid, Serum 2021 3.9     Color, UA 2021 Yellow     Clarity, UA 2021 Clear     Glucose, Ur 2021 Negative     Bilirubin Urine 2021 Negative     Ketones, Urine 2021 Negative     Specific Gravity, UA 2021 1.015     Blood, Urine 2021 Negative     pH, UA 2021 7.0     Protein, UA 2021 Negative     Urobilinogen, Urine 2021 0.2     Nitrite, Urine 2021 Negative     Leukocyte Esterase, Urine 2021 Negative     Microscopic Examination 2021 Not Indicated     Urine Type 2021 NotGiven     LD 2021 133        Assessment/Plan:   23 y.o.  at 36w6d here from office for elevated blood pressures    1. FWB -reassuring  - cEFM    2. Elevated blood pressures  - HELLP labs overall wnl as above, P:C ratio 0.1  - BP in triage 604-603/65-45 (only one systolic in 848Y, remainder low 140s or normal range). Asymtpomatic, does not meet criteria for gHTN at this time  - offered pt overnight observation in hospital vs d/c home at this time with BP check in office tomorrow, pt opts for d/c home at this time. Strict return precautions reviewed, all questions answered. - will have office call patient in AM for appt for BP check    3.  O+/ab-, RI, GBS unknown  - GBS swab sent today from triage    Darrian Black MD

## 2021-11-23 NOTE — PROGRESS NOTES
23 y.o. Joel Ards at [address] Estimated Date of Delivery: 12/15/21 here for FRANCIS:     Pt seen and examined. No concerns/complaints. Denies VB, LOF, CTX. Endorses (+) FM. Denies fevers / chills / chest pain / shortness of breath. . MWQ reviewed (Score:2). BP elevated 160/96, repeat 152/106 --  Denies HA, changes with vision, RUQ pain, edema. GBS today     Objective:  BP (!) 152/106   Pulse 111   Wt 146 lb 3.2 oz (66.3 kg)   LMP 03/10/2021   BMI 23.60 kg/m²   Gen: AO, NAD  Abd: Soft, NT, gravid   Ext: Mild LE edema  OMM: Increased lumbar lordosis    Assessment/Plan:   Diagnosis Orders   1. Prenatal care, subsequent pregnancy in third trimester     2. Elevated blood-pressure reading without diagnosis of hypertension     3. Late prenatal care affecting pregnancy in second trimester     4.  screening encounter  Culture, Strep B Screen, Vaginal/Rectal      - reassuring fetal status   - to LD for Pre Eclampsia work up, possible delivery   - if able to go home, short interval FU in office   - GBS pending     Follow Up  Return OB precautions reviewed   Return for Pending triage evaluation. Approximately 20 minutes spent in room counseling patient on condition and coordination of care with over 50% in direct face to face counseling.      Mary Rosario DO

## 2021-11-24 ENCOUNTER — NURSE ONLY (OUTPATIENT)
Dept: OBGYN CLINIC | Age: 26
End: 2021-11-24

## 2021-11-24 ENCOUNTER — HOSPITAL ENCOUNTER (OUTPATIENT)
Age: 26
Discharge: HOME | End: 2021-11-24
Attending: OBSTETRICS & GYNECOLOGY | Admitting: OBSTETRICS & GYNECOLOGY
Payer: COMMERCIAL

## 2021-11-24 ENCOUNTER — TELEPHONE (OUTPATIENT)
Dept: OBGYN CLINIC | Age: 26
End: 2021-11-24

## 2021-11-24 VITALS
BODY MASS INDEX: 28.24 KG/M2 | SYSTOLIC BLOOD PRESSURE: 160 MMHG | TEMPERATURE: 98.1 F | WEIGHT: 154.4 LBS | DIASTOLIC BLOOD PRESSURE: 90 MMHG | HEART RATE: 108 BPM

## 2021-11-24 VITALS
DIASTOLIC BLOOD PRESSURE: 83 MMHG | BODY MASS INDEX: 28.52 KG/M2 | RESPIRATION RATE: 16 BRPM | TEMPERATURE: 98 F | HEART RATE: 98 BPM | SYSTOLIC BLOOD PRESSURE: 131 MMHG | HEIGHT: 62 IN | WEIGHT: 155 LBS

## 2021-11-24 DIAGNOSIS — R03.0 ELEVATED BLOOD-PRESSURE READING WITHOUT DIAGNOSIS OF HYPERTENSION: Primary | ICD-10-CM

## 2021-11-24 LAB
A/G RATIO: 1.3 (ref 1.1–2.2)
ABO/RH: NORMAL
ALBUMIN SERPL-MCNC: 3.5 G/DL (ref 3.4–5)
ALP BLD-CCNC: 124 U/L (ref 40–129)
ALT SERPL-CCNC: 7 U/L (ref 10–40)
ANION GAP SERPL CALCULATED.3IONS-SCNC: 12 MMOL/L (ref 3–16)
ANTIBODY SCREEN: NORMAL
APTT: 30.1 SEC (ref 26.2–38.6)
AST SERPL-CCNC: 17 U/L (ref 15–37)
BASOPHILS ABSOLUTE: 0.1 K/UL (ref 0–0.2)
BASOPHILS RELATIVE PERCENT: 0.4 %
BILIRUB SERPL-MCNC: <0.2 MG/DL (ref 0–1)
BUN BLDV-MCNC: 6 MG/DL (ref 7–20)
CALCIUM SERPL-MCNC: 9.2 MG/DL (ref 8.3–10.6)
CHLORIDE BLD-SCNC: 104 MMOL/L (ref 99–110)
CO2: 20 MMOL/L (ref 21–32)
CREAT SERPL-MCNC: <0.5 MG/DL (ref 0.6–1.1)
CREATININE URINE: 108.1 MG/DL (ref 28–259)
EOSINOPHILS ABSOLUTE: 0.1 K/UL (ref 0–0.6)
EOSINOPHILS RELATIVE PERCENT: 0.4 %
GFR AFRICAN AMERICAN: >60
GFR NON-AFRICAN AMERICAN: >60
GLUCOSE BLD-MCNC: 84 MG/DL (ref 70–99)
HCT VFR BLD CALC: 35.2 % (ref 36–48)
HEMOGLOBIN: 11.5 G/DL (ref 12–16)
INR BLD: 0.96 (ref 0.88–1.12)
LACTATE DEHYDROGENASE: 126 U/L (ref 100–190)
LYMPHOCYTES ABSOLUTE: 2.6 K/UL (ref 1–5.1)
LYMPHOCYTES RELATIVE PERCENT: 19.8 %
MCH RBC QN AUTO: 26.4 PG (ref 26–34)
MCHC RBC AUTO-ENTMCNC: 32.8 G/DL (ref 31–36)
MCV RBC AUTO: 80.5 FL (ref 80–100)
MONOCYTES ABSOLUTE: 0.8 K/UL (ref 0–1.3)
MONOCYTES RELATIVE PERCENT: 6 %
NEUTROPHILS ABSOLUTE: 9.6 K/UL (ref 1.7–7.7)
NEUTROPHILS RELATIVE PERCENT: 73.4 %
PDW BLD-RTO: 13.4 % (ref 12.4–15.4)
PLATELET # BLD: 154 K/UL (ref 135–450)
PMV BLD AUTO: 8.5 FL (ref 5–10.5)
POTASSIUM SERPL-SCNC: 3.8 MMOL/L (ref 3.5–5.1)
PROTEIN PROTEIN: 9 MG/DL
PROTEIN/CREAT RATIO: 0.1 MG/DL
PROTHROMBIN TIME: 10.8 SEC (ref 9.9–12.7)
RBC # BLD: 4.38 M/UL (ref 4–5.2)
SODIUM BLD-SCNC: 136 MMOL/L (ref 136–145)
TOTAL PROTEIN: 6.1 G/DL (ref 6.4–8.2)
URIC ACID, SERUM: 4.2 MG/DL (ref 2.6–6)
WBC # BLD: 13.1 K/UL (ref 4–11)

## 2021-11-24 PROCEDURE — 99218 PR INITIAL OBSERVATION CARE/DAY 30 MINUTES: CPT | Performed by: OBSTETRICS & GYNECOLOGY

## 2021-11-24 PROCEDURE — 85610 PROTHROMBIN TIME: CPT

## 2021-11-24 PROCEDURE — 83615 LACTATE (LD) (LDH) ENZYME: CPT

## 2021-11-24 PROCEDURE — 84156 ASSAY OF PROTEIN URINE: CPT

## 2021-11-24 PROCEDURE — 86850 RBC ANTIBODY SCREEN: CPT

## 2021-11-24 PROCEDURE — 86901 BLOOD TYPING SEROLOGIC RH(D): CPT

## 2021-11-24 PROCEDURE — 85025 COMPLETE CBC W/AUTO DIFF WBC: CPT

## 2021-11-24 PROCEDURE — 86900 BLOOD TYPING SEROLOGIC ABO: CPT

## 2021-11-24 PROCEDURE — 84550 ASSAY OF BLOOD/URIC ACID: CPT

## 2021-11-24 PROCEDURE — 80053 COMPREHEN METABOLIC PANEL: CPT

## 2021-11-24 PROCEDURE — 82570 ASSAY OF URINE CREATININE: CPT

## 2021-11-24 PROCEDURE — 85730 THROMBOPLASTIN TIME PARTIAL: CPT

## 2021-11-24 RX ORDER — BLOOD PRESSURE TEST KIT
1 KIT MISCELLANEOUS ONCE
Qty: 1 KIT | Refills: 0 | Status: SHIPPED | OUTPATIENT
Start: 2021-11-24 | End: 2021-11-24

## 2021-11-24 NOTE — PROGRESS NOTES
Dr. León Whitehead at bedside, patient ok to discharge home at this time. Discharge instructions and precautions given at this time.

## 2021-11-24 NOTE — TELEPHONE ENCOUNTER
Per Dr. Cheri Negro, want patient in today for nurse visit with BP check. [phone] (home)     Called patient , no answer, left VM to return call to office.

## 2021-11-24 NOTE — PROGRESS NOTES
D/c instructions given. Pt verbalized understanding and denied questions. Pt left OB unit @ 1957 in stable condition, ambulatory and undelivered. Not in active labor.

## 2021-11-24 NOTE — H&P
Obstetrics Triage History and Physical    CC: Elevated blood pressures    HPI: Shelley Baca is a 23 y.o. Gearjessica Campos at 37w0d who was sent from the office for evaluation secondary to elevated blood pressures    Patient is overall doing well. +FM. Denies VB, LOF, contractions. Denies headache, vision changes, RUQ pain, increased LE edema. Denies chest pain, shortness of breath, fever, chills, nausea, vomiting. Pregnancy has been complicated by late prenatal care and new onset BP elevations. Review of Systems: The following ROS was otherwise negative, except as noted in the HPI: constitutional, HEENT, respiratory, cardiovascular, gastrointestinal, genitourinary, skin, musculoskeletal, neurological, psych. OBGYN Provider : MAURA Finley DO     Obstetrical History:  OB History    Para Term  AB Living   2 1 1 0 0 1   SAB IAB Ectopic Molar Multiple Live Births   0 0 0 0 0 1      # Outcome Date GA Lbr Shahid/2nd Weight Sex Delivery Anes PTL Lv   2 Current            1 Term 10/24/17 40w4d   F Vag-Spont EPI N KIRK      Name: Selena Sanchez: 9  Apgar5: 9       Past Medical History:   Past Medical History:   Diagnosis Date    Glucose intolerance of pregnancy     failed 1 hr       Medications:  Prior to Admission medications    Medication Sig Start Date End Date Taking? Authorizing Provider   Prenatal Multivit-Min-Fe-FA (PRENATAL 1 + IRON PO) Take 1 tablet by mouth daily   Yes Historical Provider, MD        Allergies:  Patient has no known allergies. Surgical History:  History reviewed. No pertinent surgical history.     Family History:  Family History   Problem Relation Age of Onset    High Blood Pressure Maternal Grandmother     Heart Disease Maternal Grandfather     Stroke Maternal Grandfather        Social History:  Social History     Substance and Sexual Activity   Alcohol Use No     Social History     Substance and Sexual Activity   Drug Use No     Social History     Tobacco Use Smoking Status Never Smoker   Smokeless Tobacco Never Used       Physical Exam:  /83   Pulse 98   Temp 98 °F (36.7 °C) (Oral)   Resp 16   Ht 5' 2\" (1.575 m)   Wt 155 lb (70.3 kg)   LMP 03/10/2021   BMI 28.35 kg/m²   General: Alert, well appearing, no acute distress  Head: Normocephalic, atraumatic  Lungs: Clear to auscultation bilaterally without rales, rhonchi, wheezing  CV: Regular rate and regular rhythm, normal S1, S2 without murmurs, rubs, clicks or gallops. Abdomen: Gravid, soft, non-tender, non-distended. No rebound or guarding. No uterine tenderness to palpation. Extremities: No redness or tenderness, neg Maddi's sign  Skin: Well perfused, normal coloration and turgor, no lesions or rashes visualized  Neuro: Alert, oriented, normal speech, no focal deficits, moves extremities appropriately. Patellar reflexes 2+ bilaterally, no clonus  Psych: Appropriate, normal affect, appears stated age  Pelvic: Normal appearing external genitalia.   Cervix 1/50/-3    Fetal Heart Monitor Interpretation:   FHT: 140 bpm, mod variability, +accels, neg decels  [place]: Occasional contractions    Labs:  Admission on 11/24/2021   Component Date Value    WBC 11/24/2021 13.1*    RBC 11/24/2021 4.38     Hemoglobin 11/24/2021 11.5*    Hematocrit 11/24/2021 35.2*    MCV 11/24/2021 80.5     MCH 11/24/2021 26.4     MCHC 11/24/2021 32.8     RDW 11/24/2021 13.4     Platelets 56/22/7842 154     MPV 11/24/2021 8.5     Neutrophils % 11/24/2021 73.4     Lymphocytes % 11/24/2021 19.8     Monocytes % 11/24/2021 6.0     Eosinophils % 11/24/2021 0.4     Basophils % 11/24/2021 0.4     Neutrophils Absolute 11/24/2021 9.6*    Lymphocytes Absolute 11/24/2021 2.6     Monocytes Absolute 11/24/2021 0.8     Eosinophils Absolute 11/24/2021 0.1     Basophils Absolute 11/24/2021 0.1     Sodium 11/24/2021 136     Potassium 11/24/2021 3.8     Chloride 11/24/2021 104     CO2 11/24/2021 20*    Anion Gap 11/24/2021 12  Glucose 11/24/2021 84     BUN 11/24/2021 6*    CREATININE 11/24/2021 <0.5*    GFR Non- 11/24/2021 >60     GFR  11/24/2021 >60     Calcium 11/24/2021 9.2     Total Protein 11/24/2021 6.1*    Albumin 11/24/2021 3.5     Albumin/Globulin Ratio 11/24/2021 1.3     Total Bilirubin 11/24/2021 <0.2     Alkaline Phosphatase 11/24/2021 124     ALT 11/24/2021 7*    AST 11/24/2021 17     Uric Acid, Serum 11/24/2021 4.2     LD 11/24/2021 126     Protein, Ur 11/24/2021 9.00     Creatinine, Ur 11/24/2021 108.1     Protein/Creat Ratio 11/24/2021 0.1     aPTT 11/24/2021 30.1     Protime 11/24/2021 10.8     INR 11/24/2021 0.96        Assessment/Plan:     1. Yoandy Goetz is a 23 y.o. Ramos Randy at 37w0d     2. Gestational hypertension     - BP in triage 128-142/68-87     - Currently asymptomatic     - HELLP labs stable     - UPC 0.1     - Risks, benefits and alternatives were reviewed with the patient regarding expectant management vs induction of labor. Patient declines IOL today as she would like to spend the Holiday's with her family. Aware of risks of progression to preeclampsia and adverse sequalae of such.       - Patient agreeable to IOL 11/27/2021 at 5pm and placed on the scheduled for such     - Rx provided for BP cuff and patient given instructions to report BP > 140-150/90     - Patent counseled to call with headaches, vision changes, RUQ Pain, increased LE edema, chest pain, shortness of breath elevated BP, abdominal pain or decreased FM.      3. Fetal wellbeing     - Reactive    Disposition:   Stable for discharge home  Discharge instructions reviewed  Labor, decreased FM, VB, LOF, HTN precautions reviewed  Rx provided for blood pressure cuff  Follow-up for IOL 11/27/2021 at [facility]

## 2021-11-24 NOTE — PROGRESS NOTES
Discharge instructions given. Allowed time for questions/answers. Verbalizes understanding of all instructions given. Ambulated out, undelivered, not in active labor.

## 2021-11-26 LAB — GROUP B STREP CULTURE: NORMAL

## 2021-11-27 ENCOUNTER — HOSPITAL ENCOUNTER (INPATIENT)
Age: 26
LOS: 3 days | Discharge: HOME | End: 2021-11-30
Attending: OBSTETRICS & GYNECOLOGY | Admitting: OBSTETRICS & GYNECOLOGY
Payer: COMMERCIAL

## 2021-11-27 ENCOUNTER — APPOINTMENT (OUTPATIENT)
Dept: LABOR AND DELIVERY | Age: 26
End: 2021-11-27
Payer: COMMERCIAL

## 2021-11-27 LAB
A/G RATIO: 1.3 (ref 1.1–2.2)
ABO/RH: NORMAL
ALBUMIN SERPL-MCNC: 3.5 G/DL (ref 3.4–5)
ALP BLD-CCNC: 138 U/L (ref 40–129)
ALT SERPL-CCNC: 7 U/L (ref 10–40)
AMPHETAMINE SCREEN, URINE: NORMAL
ANION GAP SERPL CALCULATED.3IONS-SCNC: 11 MMOL/L (ref 3–16)
ANTIBODY SCREEN: NORMAL
AST SERPL-CCNC: 18 U/L (ref 15–37)
BARBITURATE SCREEN URINE: NORMAL
BASOPHILS ABSOLUTE: 0 K/UL (ref 0–0.2)
BASOPHILS RELATIVE PERCENT: 0.1 %
BENZODIAZEPINE SCREEN, URINE: NORMAL
BILIRUB SERPL-MCNC: <0.2 MG/DL (ref 0–1)
BUN BLDV-MCNC: 6 MG/DL (ref 7–20)
BUPRENORPHINE URINE: NORMAL
CALCIUM SERPL-MCNC: 9.4 MG/DL (ref 8.3–10.6)
CANNABINOID SCREEN URINE: NORMAL
CHLORIDE BLD-SCNC: 103 MMOL/L (ref 99–110)
CO2: 21 MMOL/L (ref 21–32)
COCAINE METABOLITE SCREEN URINE: NORMAL
CREAT SERPL-MCNC: <0.5 MG/DL (ref 0.6–1.1)
EOSINOPHILS ABSOLUTE: 0 K/UL (ref 0–0.6)
EOSINOPHILS RELATIVE PERCENT: 0.3 %
GFR AFRICAN AMERICAN: >60
GFR NON-AFRICAN AMERICAN: >60
GLUCOSE BLD-MCNC: 90 MG/DL (ref 70–99)
HCT VFR BLD CALC: 33.4 % (ref 36–48)
HEMOGLOBIN: 11.1 G/DL (ref 12–16)
LACTATE DEHYDROGENASE: 142 U/L (ref 100–190)
LYMPHOCYTES ABSOLUTE: 2.3 K/UL (ref 1–5.1)
LYMPHOCYTES RELATIVE PERCENT: 20.9 %
Lab: NORMAL
MCH RBC QN AUTO: 26.5 PG (ref 26–34)
MCHC RBC AUTO-ENTMCNC: 33.2 G/DL (ref 31–36)
MCV RBC AUTO: 79.9 FL (ref 80–100)
METHADONE SCREEN, URINE: NORMAL
MONOCYTES ABSOLUTE: 0.7 K/UL (ref 0–1.3)
MONOCYTES RELATIVE PERCENT: 6.3 %
NEUTROPHILS ABSOLUTE: 7.9 K/UL (ref 1.7–7.7)
NEUTROPHILS RELATIVE PERCENT: 72.4 %
OPIATE SCREEN URINE: NORMAL
OXYCODONE URINE: NORMAL
PDW BLD-RTO: 13.3 % (ref 12.4–15.4)
PH UA: 6
PHENCYCLIDINE SCREEN URINE: NORMAL
PLATELET # BLD: 177 K/UL (ref 135–450)
PMV BLD AUTO: 8.7 FL (ref 5–10.5)
POTASSIUM SERPL-SCNC: 3.8 MMOL/L (ref 3.5–5.1)
PROPOXYPHENE SCREEN: NORMAL
RBC # BLD: 4.18 M/UL (ref 4–5.2)
SODIUM BLD-SCNC: 135 MMOL/L (ref 136–145)
TOTAL PROTEIN: 6.1 G/DL (ref 6.4–8.2)
URIC ACID, SERUM: 3.8 MG/DL (ref 2.6–6)
WBC # BLD: 10.9 K/UL (ref 4–11)

## 2021-11-27 PROCEDURE — 83615 LACTATE (LD) (LDH) ENZYME: CPT

## 2021-11-27 PROCEDURE — 2580000003 HC RX 258: Performed by: OBSTETRICS & GYNECOLOGY

## 2021-11-27 PROCEDURE — 85025 COMPLETE CBC W/AUTO DIFF WBC: CPT

## 2021-11-27 PROCEDURE — 80307 DRUG TEST PRSMV CHEM ANLYZR: CPT

## 2021-11-27 PROCEDURE — 84550 ASSAY OF BLOOD/URIC ACID: CPT

## 2021-11-27 PROCEDURE — 1220000000 HC SEMI PRIVATE OB R&B

## 2021-11-27 PROCEDURE — 86901 BLOOD TYPING SEROLOGIC RH(D): CPT

## 2021-11-27 PROCEDURE — 87591 N.GONORRHOEAE DNA AMP PROB: CPT

## 2021-11-27 PROCEDURE — 86900 BLOOD TYPING SEROLOGIC ABO: CPT

## 2021-11-27 PROCEDURE — 86780 TREPONEMA PALLIDUM: CPT

## 2021-11-27 PROCEDURE — 80053 COMPREHEN METABOLIC PANEL: CPT

## 2021-11-27 PROCEDURE — 86850 RBC ANTIBODY SCREEN: CPT

## 2021-11-27 PROCEDURE — 87491 CHLMYD TRACH DNA AMP PROBE: CPT

## 2021-11-27 RX ORDER — SODIUM CHLORIDE 0.9 % (FLUSH) 0.9 %
5-40 SYRINGE (ML) INJECTION PRN
Status: DISCONTINUED | OUTPATIENT
Start: 2021-11-27 | End: 2021-11-28

## 2021-11-27 RX ORDER — SODIUM CHLORIDE, SODIUM LACTATE, POTASSIUM CHLORIDE, AND CALCIUM CHLORIDE .6; .31; .03; .02 G/100ML; G/100ML; G/100ML; G/100ML
500 INJECTION, SOLUTION INTRAVENOUS PRN
Status: DISCONTINUED | OUTPATIENT
Start: 2021-11-27 | End: 2021-11-28

## 2021-11-27 RX ORDER — ONDANSETRON 2 MG/ML
4 INJECTION INTRAMUSCULAR; INTRAVENOUS EVERY 6 HOURS PRN
Status: DISCONTINUED | OUTPATIENT
Start: 2021-11-27 | End: 2021-11-28

## 2021-11-27 RX ORDER — SODIUM CHLORIDE 0.9 % (FLUSH) 0.9 %
5-40 SYRINGE (ML) INJECTION EVERY 12 HOURS SCHEDULED
Status: DISCONTINUED | OUTPATIENT
Start: 2021-11-27 | End: 2021-11-28

## 2021-11-27 RX ORDER — SODIUM CHLORIDE, SODIUM LACTATE, POTASSIUM CHLORIDE, AND CALCIUM CHLORIDE .6; .31; .03; .02 G/100ML; G/100ML; G/100ML; G/100ML
1000 INJECTION, SOLUTION INTRAVENOUS PRN
Status: DISCONTINUED | OUTPATIENT
Start: 2021-11-27 | End: 2021-11-28

## 2021-11-27 RX ORDER — ACETAMINOPHEN 325 MG/1
650 TABLET ORAL EVERY 4 HOURS PRN
Status: DISCONTINUED | OUTPATIENT
Start: 2021-11-27 | End: 2021-11-28

## 2021-11-27 RX ORDER — SODIUM CHLORIDE 9 MG/ML
25 INJECTION, SOLUTION INTRAVENOUS PRN
Status: DISCONTINUED | OUTPATIENT
Start: 2021-11-27 | End: 2021-11-28

## 2021-11-27 RX ORDER — SODIUM CHLORIDE, SODIUM LACTATE, POTASSIUM CHLORIDE, CALCIUM CHLORIDE 600; 310; 30; 20 MG/100ML; MG/100ML; MG/100ML; MG/100ML
INJECTION, SOLUTION INTRAVENOUS CONTINUOUS
Status: DISCONTINUED | OUTPATIENT
Start: 2021-11-27 | End: 2021-11-28

## 2021-11-27 RX ORDER — DOCUSATE SODIUM 100 MG/1
100 CAPSULE, LIQUID FILLED ORAL 2 TIMES DAILY
Status: DISCONTINUED | OUTPATIENT
Start: 2021-11-27 | End: 2021-11-28

## 2021-11-27 RX ADMIN — SODIUM CHLORIDE, POTASSIUM CHLORIDE, SODIUM LACTATE AND CALCIUM CHLORIDE: 600; 310; 30; 20 INJECTION, SOLUTION INTRAVENOUS at 17:57

## 2021-11-27 ASSESSMENT — PAIN - FUNCTIONAL ASSESSMENT: PAIN_FUNCTIONAL_ASSESSMENT: 0-10

## 2021-11-27 NOTE — H&P
Substance and Sexual Activity   Drug Use No     Social History     Tobacco Use   Smoking Status Never Smoker   Smokeless Tobacco Never Used       Physical Exam:  LMP 03/10/2021   General: Alert, well appearing, no acute distress  Head: Normocephalic, atraumatic  Lungs: resp effort normal   CV: Regular rate  Abdomen: Gravid, soft, non-tender, non-distended. No rebound or guarding. No uterine tenderness to palpation. Extremities: No redness or tenderness, neg Maddi's sign  Skin: Well perfused, normal coloration and turgor, no lesions or rashes visualized  Neuro: Alert, oriented, normal speech, no focal deficits, moves extremities appropriately. Patellar reflexes 2+ bilaterally, no clonus  Psych: Appropriate, normal affect, appears stated age  Pelvic: Normal appearing external genitalia. Cervix 1-2/70/-2    Vertex confirmed with US. Fetal Heart Monitor Interpretation:   FHT: 140 bpm, mod variability, +accels, neg decels  [place]: Occasional contractions    Labs:  No visits with results within 1 Day(s) from this visit.    Latest known visit with results is:   Admission on 11/24/2021, Discharged on 11/24/2021   Component Date Value    WBC 11/24/2021 13.1*    RBC 11/24/2021 4.38     Hemoglobin 11/24/2021 11.5*    Hematocrit 11/24/2021 35.2*    MCV 11/24/2021 80.5     MCH 11/24/2021 26.4     MCHC 11/24/2021 32.8     RDW 11/24/2021 13.4     Platelets 92/21/9595 154     MPV 11/24/2021 8.5     Neutrophils % 11/24/2021 73.4     Lymphocytes % 11/24/2021 19.8     Monocytes % 11/24/2021 6.0     Eosinophils % 11/24/2021 0.4     Basophils % 11/24/2021 0.4     Neutrophils Absolute 11/24/2021 9.6*    Lymphocytes Absolute 11/24/2021 2.6     Monocytes Absolute 11/24/2021 0.8     Eosinophils Absolute 11/24/2021 0.1     Basophils Absolute 11/24/2021 0.1     Sodium 11/24/2021 136     Potassium 11/24/2021 3.8     Chloride 11/24/2021 104     CO2 11/24/2021 20*    Anion Gap 11/24/2021 12     Glucose 2021 84     BUN 2021 6*    CREATININE 2021 <0.5*    GFR Non- 2021 >60     GFR  2021 >60     Calcium 2021 9.2     Total Protein 2021 6.1*    Albumin 2021 3.5     Albumin/Globulin Ratio 2021 1.3     Total Bilirubin 2021 <0.2     Alkaline Phosphatase 2021 124     ALT 2021 7*    AST 2021 17     Uric Acid, Serum 2021 4.2     LD 2021 126     ABO/Rh 2021 O POS     Antibody Screen 2021 NEG     Protein, Ur 2021 9.00     Creatinine, Ur 2021 108.1     Protein/Creat Ratio 2021 0.1     aPTT 2021 30.1     Protime 2021 10.8     INR 2021 0.96        Assessment/Plan:   1. Romie Greco is a 23 y.o.  at 37w3d, here for MIOL:    - admit to LD  - Routine labor orders  - GBS (-)   - s/p beasley bulb placement without difficulty     2. Gestational hypertension     - BP normal      - Currently asymptomatic     - HELLP labs stable     - UPC 0.1     - repeat labs pending     3. Fetal wellbeing     - Reactive / Cat 1      Please call with questions or concerns.    Mary Rosario, DO

## 2021-11-27 NOTE — PROGRESS NOTES
at pt bedside. Lower abdominal ultrasound performed and fetal position confirmed vertex. SVE of 1-2/70/-2 and cooks beasley bulb placed at this time with 60ml of NS in uterine balloon and 40 ml of NS in vaginal balloon. Pt tolerated placement well. Pt agreeable to POC per . Pt denies any further questions at this time.

## 2021-11-27 NOTE — PROGRESS NOTES
Patient ambulatory to room 1729 for admission for labor. Patient and family oriented to room. Call light explained and provided. EFM applied with consent to central monitor bank with alarms on. Uterus soft and non-tender. Admission history obtained, laboratory results reviewed and appropriate consents signed/reviewed. Reviewed  safety and security, initial care of the  and medications given at delivery. Vital signs obtained and pt placed on EFM. Questions and concerns addressed/answered.

## 2021-11-28 ENCOUNTER — ANESTHESIA EVENT (OUTPATIENT)
Dept: LABOR AND DELIVERY | Age: 26
End: 2021-11-28
Payer: COMMERCIAL

## 2021-11-28 ENCOUNTER — ANESTHESIA (OUTPATIENT)
Dept: LABOR AND DELIVERY | Age: 26
End: 2021-11-28
Payer: COMMERCIAL

## 2021-11-28 LAB — TOTAL SYPHILLIS IGG/IGM: NORMAL

## 2021-11-28 PROCEDURE — 6370000000 HC RX 637 (ALT 250 FOR IP): Performed by: OBSTETRICS & GYNECOLOGY

## 2021-11-28 PROCEDURE — 2500000003 HC RX 250 WO HCPCS

## 2021-11-28 PROCEDURE — 3700000025 EPIDURAL BLOCK: Performed by: ANESTHESIOLOGY

## 2021-11-28 PROCEDURE — 3E033VJ INTRODUCTION OF OTHER HORMONE INTO PERIPHERAL VEIN, PERCUTANEOUS APPROACH: ICD-10-PCS | Performed by: OBSTETRICS & GYNECOLOGY

## 2021-11-28 PROCEDURE — 59410 OBSTETRICAL CARE: CPT | Performed by: OBSTETRICS & GYNECOLOGY

## 2021-11-28 PROCEDURE — 2580000003 HC RX 258: Performed by: OBSTETRICS & GYNECOLOGY

## 2021-11-28 PROCEDURE — 1220000000 HC SEMI PRIVATE OB R&B

## 2021-11-28 PROCEDURE — 6360000002 HC RX W HCPCS: Performed by: OBSTETRICS & GYNECOLOGY

## 2021-11-28 PROCEDURE — 51701 INSERT BLADDER CATHETER: CPT

## 2021-11-28 PROCEDURE — 2500000003 HC RX 250 WO HCPCS: Performed by: NURSE ANESTHETIST, CERTIFIED REGISTERED

## 2021-11-28 PROCEDURE — 7200000001 HC VAGINAL DELIVERY

## 2021-11-28 RX ORDER — BUPIVACAINE HYDROCHLORIDE 5 MG/ML
INJECTION, SOLUTION EPIDURAL; INTRACAUDAL PRN
Status: DISCONTINUED | OUTPATIENT
Start: 2021-11-28 | End: 2021-11-28 | Stop reason: SDUPTHER

## 2021-11-28 RX ORDER — BUPIVACAINE HYDROCHLORIDE 2.5 MG/ML
INJECTION, SOLUTION EPIDURAL; INFILTRATION; INTRACAUDAL PRN
Status: DISCONTINUED | OUTPATIENT
Start: 2021-11-28 | End: 2021-11-28 | Stop reason: SDUPTHER

## 2021-11-28 RX ORDER — SODIUM CHLORIDE 0.9 % (FLUSH) 0.9 %
5-40 SYRINGE (ML) INJECTION EVERY 12 HOURS SCHEDULED
Status: DISCONTINUED | OUTPATIENT
Start: 2021-11-28 | End: 2021-11-30 | Stop reason: HOSPADM

## 2021-11-28 RX ORDER — CARBOPROST TROMETHAMINE 250 UG/ML
250 INJECTION, SOLUTION INTRAMUSCULAR ONCE
Status: COMPLETED | OUTPATIENT
Start: 2021-11-28 | End: 2021-11-28

## 2021-11-28 RX ORDER — CARBOPROST TROMETHAMINE 250 UG/ML
INJECTION, SOLUTION INTRAMUSCULAR
Status: COMPLETED
Start: 2021-11-28 | End: 2021-11-28

## 2021-11-28 RX ORDER — LANOLIN 100 %
OINTMENT (GRAM) TOPICAL PRN
Status: DISCONTINUED | OUTPATIENT
Start: 2021-11-28 | End: 2021-11-30 | Stop reason: HOSPADM

## 2021-11-28 RX ORDER — IBUPROFEN 800 MG/1
800 TABLET ORAL EVERY 8 HOURS
Status: DISCONTINUED | OUTPATIENT
Start: 2021-11-28 | End: 2021-11-30 | Stop reason: HOSPADM

## 2021-11-28 RX ORDER — SODIUM CHLORIDE 9 MG/ML
25 INJECTION, SOLUTION INTRAVENOUS PRN
Status: DISCONTINUED | OUTPATIENT
Start: 2021-11-28 | End: 2021-11-30 | Stop reason: HOSPADM

## 2021-11-28 RX ORDER — SODIUM CHLORIDE, SODIUM LACTATE, POTASSIUM CHLORIDE, CALCIUM CHLORIDE 600; 310; 30; 20 MG/100ML; MG/100ML; MG/100ML; MG/100ML
INJECTION, SOLUTION INTRAVENOUS CONTINUOUS
Status: DISCONTINUED | OUTPATIENT
Start: 2021-11-28 | End: 2021-11-30 | Stop reason: HOSPADM

## 2021-11-28 RX ORDER — DOCUSATE SODIUM 100 MG/1
100 CAPSULE, LIQUID FILLED ORAL 2 TIMES DAILY
Status: DISCONTINUED | OUTPATIENT
Start: 2021-11-28 | End: 2021-11-30 | Stop reason: HOSPADM

## 2021-11-28 RX ORDER — MISOPROSTOL 100 UG/1
TABLET ORAL
Status: DISCONTINUED
Start: 2021-11-28 | End: 2021-11-28 | Stop reason: WASHOUT

## 2021-11-28 RX ORDER — SODIUM CHLORIDE 0.9 % (FLUSH) 0.9 %
5-40 SYRINGE (ML) INJECTION PRN
Status: DISCONTINUED | OUTPATIENT
Start: 2021-11-28 | End: 2021-11-30 | Stop reason: HOSPADM

## 2021-11-28 RX ORDER — ACETAMINOPHEN 325 MG/1
650 TABLET ORAL EVERY 4 HOURS PRN
Status: DISCONTINUED | OUTPATIENT
Start: 2021-11-28 | End: 2021-11-30 | Stop reason: HOSPADM

## 2021-11-28 RX ADMIN — MOXIFLOXACIN HYDROCHLORIDE 800 MG: 400 TABLET, FILM COATED ORAL at 21:47

## 2021-11-28 RX ADMIN — CEFAZOLIN SODIUM 2000 MG: 10 INJECTION, POWDER, FOR SOLUTION INTRAVENOUS at 15:14

## 2021-11-28 RX ADMIN — Medication 166.7 ML: at 14:43

## 2021-11-28 RX ADMIN — CARBOPROST TROMETHAMINE 250 MCG: 250 INJECTION, SOLUTION INTRAMUSCULAR at 15:09

## 2021-11-28 RX ADMIN — Medication 1 MILLI-UNITS/MIN: at 01:01

## 2021-11-28 RX ADMIN — SODIUM CHLORIDE, POTASSIUM CHLORIDE, SODIUM LACTATE AND CALCIUM CHLORIDE: 600; 310; 30; 20 INJECTION, SOLUTION INTRAVENOUS at 01:32

## 2021-11-28 RX ADMIN — SODIUM CHLORIDE, POTASSIUM CHLORIDE, SODIUM LACTATE AND CALCIUM CHLORIDE: 600; 310; 30; 20 INJECTION, SOLUTION INTRAVENOUS at 12:08

## 2021-11-28 RX ADMIN — Medication 15 ML/HR: at 12:32

## 2021-11-28 RX ADMIN — Medication 87.3 MILLI-UNITS/MIN: at 14:43

## 2021-11-28 RX ADMIN — DOCUSATE SODIUM 100 MG: 100 CAPSULE ORAL at 21:47

## 2021-11-28 RX ADMIN — BUPIVACAINE HYDROCHLORIDE 7 ML: 2.5 INJECTION, SOLUTION EPIDURAL; INFILTRATION; INTRACAUDAL; PERINEURAL at 12:26

## 2021-11-28 RX ADMIN — BUPIVACAINE HYDROCHLORIDE 6 ML: 5 INJECTION, SOLUTION EPIDURAL; INTRACAUDAL; PERINEURAL at 13:04

## 2021-11-28 RX ADMIN — SODIUM CHLORIDE, POTASSIUM CHLORIDE, SODIUM LACTATE AND CALCIUM CHLORIDE: 600; 310; 30; 20 INJECTION, SOLUTION INTRAVENOUS at 10:35

## 2021-11-28 ASSESSMENT — PAIN SCALES - GENERAL: PAINLEVEL_OUTOF10: 0

## 2021-11-28 NOTE — ANESTHESIA PROCEDURE NOTES
Epidural Block    Patient location during procedure: OB  Reason for block: labor epidural  Staffing  Resident/CRNA: Marlen Rodriguez APRN - CRNA  Preanesthetic Checklist  Completed: patient identified, IV checked, risks and benefits discussed, monitors and equipment checked, pre-op evaluation, timeout performed, anesthesia consent given, oxygen available and patient being monitored  Epidural  Patient position: sitting  Prep: ChloraPrep and site prepped and draped  Patient monitoring: continuous pulse ox and frequent blood pressure checks  Approach: midline  Location: lumbar (1-5)  Injection technique: DAHLIA saline  Provider prep: sterile gloves and mask  Needle  Needle type: Tuohy   Needle gauge: 17 G  Needle length: 3.5 in  Needle insertion depth: 4.5 cm  Catheter type: side hole  Catheter size: 19 G  Catheter at skin depth: 11 cm  Test dose: negative  Assessment  Hemodynamics: stable  Attempts: 1  Additional Notes  Pt prepped and draped in sterile fashion. Skin wheal with 1% lidocaine. DAHLIA with 3 cc NS, 25g spinal needle inserted per liya. Clear CSF visualized in hub. Needle withdrawn and catheter threaded. Negative test dose 3cc 1.5% Lidocaine with Epinephrine. Sterile dressing applied.

## 2021-11-28 NOTE — PROGRESS NOTES
Report received from [facility] [place]. Bedside report given. Introduced myself to pt as her RN for the day. I put my name and phone number on the white board and showed pt how to use her room phone to get a hold of me. Pt was given her plan of care for the day. Call light within reach. Bed in lowest position and wheels are locked. Pt verbalized understanding and denies any further needs at this time. Continue to monitor.

## 2021-11-28 NOTE — PROGRESS NOTES
Mahoney bulb removed by Dr. Marcos Maria at this time. SVE 5/70/-2. Dr. Marcos Maria offered to perform amniotomy unless patient wanted to wait until epiduralized. Pt expressed that she wished to rest at this time and hold off on amniotomy. Provider instructed pitocin may be titrated up to 20 now. Patient in agreement with POC.

## 2021-11-28 NOTE — PLAN OF CARE
Problem: Anxiety:  Goal: Level of anxiety will decrease  Description: Level of anxiety will decrease  2021 by Adithya Swan RN  Outcome: Ongoing  2021 174 by Colt Maldonado RN  Outcome: Ongoing     Problem: Breathing Pattern - Ineffective:  Goal: Able to breathe comfortably  Description: Able to breathe comfortably  2021 07 by Adithya Swan RN  Outcome: Ongoing  2021 174 by Colt Maldonado RN  Outcome: Ongoing     Problem: Infection - Intrapartum Infection:  Goal: Will show no infection signs and symptoms  Description: Will show no infection signs and symptoms  2021 by Adithya Swan RN  Outcome: Ongoing  2021 by Colt Maldonado RN  Outcome: Ongoing     Problem: Labor Process - Prolonged:  Goal: Labor progression, first stage, within specified pattern  Description: Labor progression, first stage, within specified pattern  2021 by Adithya Swan RN  Outcome: Ongoing  2021 174 by Colt Maldonado RN  Outcome: Ongoing  Goal: Labor progession, second stage, within specified pattern  Description: Labor progession, second stage, within specified pattern  2021 by Adithya Swan RN  Outcome: Ongoing  2021 by Colt Maldonado RN  Outcome: Ongoing  Goal: Uterine contractions within specified parameters  Description: Uterine contractions within specified parameters  2021 by Adithya Swan RN  Outcome: Ongoing  2021 by Colt Maldonado RN  Outcome: Ongoing     Problem:  Screening:  Goal: Ability to make informed decisions regarding treatment has improved  Description: Ability to make informed decisions regarding treatment has improved  2021 by Adithya Swan RN  Outcome: Ongoing  2021 by Colt Maldonado RN  Outcome: Ongoing     Problem: Pain - Acute:  Goal: Pain level will decrease  Description: Pain level will decrease  2021 by Adithya Swan RN  Outcome:

## 2021-11-28 NOTE — PROGRESS NOTES
Pitocin started at this time with WOJCIECH Valdes RN verifying rate, concentration, and line connection.

## 2021-11-28 NOTE — ANESTHESIA PRE PROCEDURE
Department of Anesthesiology  Preprocedure Note       Name:  Velvet Morales   Age:  23 y.o.  :  1995                                          MRN:  [id number]         Date:  2021      Surgeon: * No surgeons listed *    Procedure: * No procedures listed *    Medications prior to admission:   Prior to Admission medications    Medication Sig Start Date End Date Taking?  Authorizing Provider   Prenatal Multivit-Min-Fe-FA (PRENATAL 1 + IRON PO) Take 1 tablet by mouth daily   Yes Historical Provider, MD   Blood Pressure KIT 1 kit by Does not apply route once for 1 dose 21  Cyndee Lassiter DO       Current medications:    Current Facility-Administered Medications   Medication Dose Route Frequency Provider Last Rate Last Admin    sodium chloride 0.9 % 200 mL with fentaNYL 500 mcg, bupivacaine 0.5% 50 mL (OB) epidural             lactated ringers infusion   IntraVENous Continuous Cyndia Janet,  mL/hr at 21 1035 New Bag at 21 1035    lactated ringers bolus  500 mL IntraVENous PRN Cyndia Love, DO        Or    lactated ringers bolus  1,000 mL IntraVENous PRN Cyndia Love, DO        sodium chloride flush 0.9 % injection 5-40 mL  5-40 mL IntraVENous 2 times per day Cyndia Love, DO        sodium chloride flush 0.9 % injection 5-40 mL  5-40 mL IntraVENous PRN Cyndia Love, DO        0.9 % sodium chloride infusion  25 mL IntraVENous PRN Cyndia Love, DO        [facility] injection 4 mg  4 mg IntraVENous Q6H PRN Cyndia Love, DO        oxytocin (PITOCIN) 30 units in 500 mL infusion  87.3 ever-units/min IntraVENous Continuous PRN Cyndia Love, DO        And    oxytocin (PITOCIN) 10 unit bolus from the bag  10 Units IntraVENous PRN Cyndia Love, DO        acetaminophen (TYLENOL) tablet 650 mg  650 mg Oral Q4H PRN Cyndia Love, DO        benzocaine-menthol (DERMOPLAST) 20-0.5 % spray   Topical PRN Date of last liquid consumption: 11/27/21                        Date of last solid food consumption: 11/27/21    BMI:   Wt Readings from Last 3 Encounters:   11/27/21 150 lb (68 kg)   11/24/21 155 lb (70.3 kg)   11/24/21 154 lb 6.4 oz (70 kg)     Body mass index is 27.44 kg/m². CBC:   Lab Results   Component Value Date    WBC 10.9 11/27/2021    RBC 4.18 11/27/2021    HGB 11.1 11/27/2021    HCT 33.4 11/27/2021    MCV 79.9 11/27/2021    RDW 13.3 11/27/2021     11/27/2021       CMP:   Lab Results   Component Value Date     11/27/2021    K 3.8 11/27/2021    K 3.4 09/11/2020     11/27/2021    CO2 21 11/27/2021    BUN 6 11/27/2021    CREATININE <0.5 11/27/2021    GFRAA >60 11/27/2021    AGRATIO 1.3 11/27/2021    LABGLOM >60 11/27/2021    GLUCOSE 90 11/27/2021    PROT 6.1 11/27/2021    CALCIUM 9.4 11/27/2021    BILITOT <0.2 11/27/2021    ALKPHOS 138 11/27/2021    AST 18 11/27/2021    ALT 7 11/27/2021       POC Tests: No results for input(s): POCGLU, POCNA, POCK, POCCL, POCBUN, POCHEMO, POCHCT in the last 72 hours.     Coags:   Lab Results   Component Value Date    PROTIME 10.8 11/24/2021    INR 0.96 11/24/2021    APTT 30.1 11/24/2021       HCG (If Applicable): No results found for: PREGTESTUR, PREGSERUM, HCG, HCGQUANT     ABGs: No results found for: PHART, PO2ART, XHL0YIG, JNA8WJF, BEART, B6DJEOEE     Type & Screen (If Applicable):  No results found for: LABABO, LABRH    Drug/Infectious Status (If Applicable):  No results found for: HIV, HEPCAB    COVID-19 Screening (If Applicable): No results found for: COVID19        Anesthesia Evaluation  Patient summary reviewed and Nursing notes reviewed no history of anesthetic complications:   Airway: Mallampati: II     Neck ROM: full   Dental: normal exam         Pulmonary:Negative Pulmonary ROS and normal exam                               Cardiovascular:Negative CV ROS    (+) hypertension:,                   Neuro/Psych:   Negative Neuro/Psych ROS GI/Hepatic/Renal: Neg GI/Hepatic/Renal ROS            Endo/Other: Negative Endo/Other ROS                    Abdominal:             Vascular: Other Findings:             Anesthesia Plan      epidural     ASA 2 - emergent     (I discussed with the patient the risks and benefits of labor epidural placement. All questions were answered the patient agrees with the plan. Recent Vitals:  ---------------------------               11/28/21                      1200         ---------------------------   BP:          124/87         Pulse:         94           Resp:          16           Temp:   36.8 °C (98.2 °F)   SpO2:                      ---------------------------  Body mass index is 27.44 kg/m².     Social History    Tobacco Use      Smoking status: Never Smoker      Smokeless tobacco: Never Used      LABS:    CBC  Lab Results       Component                Value               Date/Time                  WBC                      10.9                11/27/2021 05:55 PM        HGB                      11.1 (L)            11/27/2021 05:55 PM        HCT                      33.4 (L)            11/27/2021 05:55 PM        PLT                      177                 11/27/2021 05:55 PM   RENAL  Lab Results       Component                Value               Date/Time                  NA                       135 (L)             11/27/2021 05:55 PM        K                        3.8                 11/27/2021 05:55 PM        K                        3.4 (L)             09/11/2020 08:23 PM        CL                       103                 11/27/2021 05:55 PM        CO2                      21                  11/27/2021 05:55 PM        BUN                      6 (L)               11/27/2021 05:55 PM        CREATININE               <0.5 (L)            11/27/2021 05:55 PM        GLUCOSE                  90                  11/27/2021 05:55 PM   COAGS  Lab Results       Component Value               Date/Time                  PROTIME                  10.8                11/24/2021 05:15 PM        INR                      0.96                11/24/2021 05:15 PM        APTT                     30.1                11/24/2021 05:15 PM )        Anesthetic plan and risks discussed with patient.                       Monica Shell, APRN - CRNA   11/28/2021

## 2021-11-28 NOTE — PLAN OF CARE
Problem: Anxiety:  Goal: Level of anxiety will decrease  Description: Level of anxiety will decrease  11/28/2021 0713 by Emmanuel Sweeney RN  Outcome: Ongoing  11/28/2021 0711 by Samra Sweeney RN  Outcome: Ongoing  11/27/2021 1740 by Suzi Bellamy RN  Outcome: Ongoing     Problem: Breathing Pattern - Ineffective:  Goal: Able to breathe comfortably  Description: Able to breathe comfortably  11/28/2021 0713 by Emmanuel Sweeney RN  Outcome: Ongoing  11/28/2021 0711 by Samra Sweeney RN  Outcome: Ongoing  11/27/2021 1740 by Suzi Bellamy RN  Outcome: Ongoing     Problem: Fluid Volume - Imbalance:  Goal: Absence of imbalanced fluid volume signs and symptoms  Description: Absence of imbalanced fluid volume signs and symptoms  11/28/2021 0713 by Emmanuel Sweeney RN  Outcome: Ongoing  11/28/2021 0711 by Samra Sweeney RN  Outcome: Ongoing  11/27/2021 1740 by Suzi Bellamy RN  Outcome: Ongoing  Goal: Absence of intrapartum hemorrhage signs and symptoms  Description: Absence of intrapartum hemorrhage signs and symptoms  11/28/2021 0713 by Emmanuel Sweeney RN  Outcome: Ongoing  11/28/2021 0711 by Samra Sweeney RN  Outcome: Ongoing  11/27/2021 1740 by Suzi Bellamy RN  Outcome: Ongoing     Problem: Infection - Intrapartum Infection:  Goal: Will show no infection signs and symptoms  Description: Will show no infection signs and symptoms  11/28/2021 0713 by Emmanuel Sweeney RN  Outcome: Ongoing  11/28/2021 0711 by Samra Sweeney RN  Outcome: Ongoing  11/27/2021 1740 by Suzi Bellamy RN  Outcome: Ongoing     Problem: Labor Process - Prolonged:  Goal: Labor progression, first stage, within specified pattern  Description: Labor progression, first stage, within specified pattern  11/28/2021 0713 by Emmanuel Sweeney RN  Outcome: Ongoing  11/28/2021 0711 by Samra Sweeney RN  Outcome: Ongoing  11/27/2021 1740 by Suzi Bellamy RN  Outcome: Ongoing  Goal: Labor progession, second stage, within specified pattern  Description: Labor progession, second stage, within specified pattern  11/28/2021 0713 by Valentino Novak RN  Outcome: Ongoing  11/28/2021 0711 by Keon Alejo RN  Outcome: Ongoing  11/27/2021 1740 by Jayleen Han RN  Outcome: Ongoing  Goal: Uterine contractions within specified parameters  Description: Uterine contractions within specified parameters  11/28/2021 0713 by Valentino Novak RN  Outcome: Ongoing  11/28/2021 0711 by Keon Alejo RN  Outcome: Ongoing  11/27/2021 1740 by Jayleen Han RN  Outcome: Ongoing     Problem: Pain - Acute:  Goal: Pain level will decrease  Description: Pain level will decrease  11/28/2021 0713 by Valentino Novak RN  Outcome: Ongoing  11/28/2021 0711 by Keon Alejo RN  Outcome: Ongoing  11/27/2021 1740 by Jayleen Han RN  Outcome: Ongoing  Goal: Able to cope with pain  Description: Able to cope with pain  11/28/2021 0713 by Valentino Novak RN  Outcome: Ongoing  11/28/2021 0711 by Keon Alejo RN  Outcome: Ongoing  11/27/2021 1740 by Jayleen Han RN  Outcome: Ongoing     Problem: Tissue Perfusion - Uteroplacental, Altered:  Goal: Absence of abnormal fetal heart rate pattern  Description: Absence of abnormal fetal heart rate pattern  11/28/2021 0713 by Valentino Novak RN  Outcome: Ongoing  11/28/2021 0711 by Keon Alejo RN  Outcome: Ongoing  11/27/2021 1740 by Jayleen Han RN  Outcome: Ongoing     Problem: Urinary Retention:  Goal: Experiences of bladder distention will decrease  Description: Experiences of bladder distention will decrease  11/28/2021 0713 by Valentino Novak RN  Outcome: Ongoing  11/28/2021 0711 by Keon Alejo RN  Outcome: Ongoing  11/27/2021 1740 by Jayleen Han RN  Outcome: Ongoing  Goal: Urinary elimination within specified parameters  Description: Urinary elimination within specified parameters  11/28/2021 0713 by Valentino Novak RN  Outcome: Ongoing  11/28/2021 0711 by Keon Alejo RN  Outcome: Ongoing  11/27/2021 1740 by Bobby Fitzpatrick RN  Outcome: Ongoing     Problem: Pain:  Goal: Pain level will decrease  Description: Pain level will decrease  11/28/2021 0713 by Garland Franco RN  Outcome: Ongoing  11/28/2021 0711 by Autumn Goldstein RN  Outcome: Ongoing  11/27/2021 1740 by Bobby Fitzpatrick RN  Outcome: Ongoing  Goal: Control of acute pain  Description: Control of acute pain  11/28/2021 0713 by Garland Franco RN  Outcome: Ongoing  11/28/2021 0711 by Autumn Goldstein RN  Outcome: Ongoing  Goal: Control of chronic pain  Description: Control of chronic pain  11/28/2021 0713 by Garland Franco RN  Outcome: Ongoing  11/28/2021 0711 by Autumn Goldstein RN  Outcome: Ongoing

## 2021-11-28 NOTE — PROGRESS NOTES
Brief Labor note:     23 y.o.  @ 37w4d, here for IOL. Pt seen and examined. Doing well. Denies HA / vision changes, RUQ pain or worsening edema at this time.      FHT:   140 bpm   Mod variability  (+) accels, (-) decels   Contractions q 2-5 min     VE:    5/70/-2     IV Pitocin: 10 mU     Plan:  Continue IV Pitocin per protocol   Reassuring fetal status   Continuous fetal monitoring     Please call with questions or concerns   Shell Gallo, DO

## 2021-11-28 NOTE — PROGRESS NOTES
Dr. Silvia Arthur notified via telephone that pt pitocin has been at 20 milliunits/hr for a little over an hour. Contractions are every 2-4 minutes and pt is rating them a 3-4. Dr. Silvia Arthur will be at bedside soon to evaluate pt.

## 2021-11-28 NOTE — PROGRESS NOTES
First time get up to BR w/ assist x 2 RN's. Pt unable to void at this time. Pt performed juan care per self after instruction. New ice pack, peripad, and underwear provided. Pt back to bed w/ out incident, gait steady. Pt tolerated well.

## 2021-11-28 NOTE — PROGRESS NOTES
Brief PP note:     23 y.o. Haroldine Gowers s/p   Called to room by nursing due to heavy bleeding. Fundus soft, clot palpated in YOVANI / removed. Hemabate 0.2mg given IM. Improved uterine tone, minimal bleeding. Vitals remained stable and pt feels well. Trying to breastfeed.      Plan:  Continue routine pp care     Please call with questions or concerns   Haydee Evans, DO

## 2021-11-28 NOTE — PROGRESS NOTES
Dr. Yeimi aGspar called to bedside to evaluate pt bleeding. Uterus boggy and several large clots expressed. Dr. Yeimi Gaspar on way to bedside.

## 2021-11-28 NOTE — PROGRESS NOTES
POC reviewed with Dr. Ruddy Gutiérrez. Plan to proceed with beasley bulb ripening without use of pitocin until catheter comes out. Provider ok with beasley bulb being tugged as desired by nurse.

## 2021-11-28 NOTE — PROGRESS NOTES
Pt actively pushing. RN and provider remained in continuous attendance at the bedside assessing fetal heart rate per EFM.

## 2021-11-28 NOTE — L&D DELIVERY NOTE
[facility] Obstetrics and Gynecology  Spontaneous Vaginal Delivery Note        Pre-operative Diagnosis:    23 y.o. Joel Ards at 37w3d  [facility]   Late Prenatal Care       Post-operative Diagnosis:   same             Anesthesia:  Epidural      Admission and Delivery:       Patient presented to [address] and Delivery for IOL. She received FBC, IV Pitocin and Epidural for comfort. Patient progressed spontaneously to complete cervical dilation. Began pushing and with good maternal effort. The infant was delivered in the direct OA position over intact perineum. Nuchal cord absent. The shoulders and body were quickly to follow with maternal efforts. Infant was delivered with good tone and spontaneous cry without complication. Mouth and nose were bulb suctioned at maternal abdomen. Delayed cord clamping, partner cut cord after 1 min as cord no longer pulsating. Cord segment and cord blood were obtained. Cord gasses were collected, however due to fetal wellbeing were discarded. APGARS were noted to be 9 and 9. Delivery Date and Time: 11/28/2021 1438. The placenta was delivered spontaneously with manual massage of the uterus and was noted to be intact with a 3 vessel cord. Pitocin was started immediately after placenta delivery. Bleeding noted to be appropriate. No laceration was noted. The patient tolerated well and is in stable condition following delivery. EBL: 300 ml     Infant Wt: Pending     APGARS: 9, 9      Specimen:  Placenta / Cord segment      Condition:  infant stable and mother stable      Attending Attestation: I performed the procedure.     Mary Rosario DO

## 2021-11-28 NOTE — PROGRESS NOTES
Dr. Renuka Braswell at bedside. AROM done at this time for moderate amount of clear fluid. Pt is 6/80%/-2.

## 2021-11-28 NOTE — PROGRESS NOTES
Dr. Bill Finley updated on patient status. Pt has not entered adequate contraction pattern at this time and is feeling minimal pressure. Provider gave RN verbal orders to start pitocin with max rate of 10 until beasley bulb comes out. If ebasley bulb has not come out by 0600, RN to notify provider who will likely come to bedside and remove. Provider stated patient can have epidural at anytime.

## 2021-11-29 LAB
C. TRACHOMATIS DNA ,URINE: NEGATIVE
HCT VFR BLD CALC: 27.2 % (ref 36–48)
HCT VFR BLD CALC: 28.1 % (ref 36–48)
HEMOGLOBIN: 8.8 G/DL (ref 12–16)
HEMOGLOBIN: 9.3 G/DL (ref 12–16)
N. GONORRHOEAE DNA, URINE: NEGATIVE

## 2021-11-29 PROCEDURE — 1220000000 HC SEMI PRIVATE OB R&B

## 2021-11-29 PROCEDURE — 85014 HEMATOCRIT: CPT

## 2021-11-29 PROCEDURE — 99024 POSTOP FOLLOW-UP VISIT: CPT | Performed by: OBSTETRICS & GYNECOLOGY

## 2021-11-29 PROCEDURE — 85018 HEMOGLOBIN: CPT

## 2021-11-29 PROCEDURE — 36415 COLL VENOUS BLD VENIPUNCTURE: CPT

## 2021-11-29 PROCEDURE — 6370000000 HC RX 637 (ALT 250 FOR IP): Performed by: OBSTETRICS & GYNECOLOGY

## 2021-11-29 RX ADMIN — MOXIFLOXACIN HYDROCHLORIDE 800 MG: 400 TABLET, FILM COATED ORAL at 16:00

## 2021-11-29 RX ADMIN — DOCUSATE SODIUM 100 MG: 100 CAPSULE ORAL at 07:52

## 2021-11-29 ASSESSMENT — PAIN SCALES - GENERAL
PAINLEVEL_OUTOF10: 0
PAINLEVEL_OUTOF10: 0

## 2021-11-29 NOTE — FLOWSHEET NOTE
Lactation Progress Note      Data:   RN requesting LC for multip breast feeder who delivered yesterday. Baby is not yet 25 hours old. Baby has dad 3 urine diapers but no stool yet. Mob states that baby just finished a short sleepy feed using a nipple shield. Baby currently asleep at breast. Mob states that she BF first baby x 7 months and did not use a nipple shield. Action: Breast feeding education provided. Stressed importance of allowing baby to go to breast ad linda and always assuring a good deep latch. Explained that nipple shield use should be avoided if possible. LC changed a wet diaper to attempt to wake baby. Baby began gagging but no spit and then asleep. Encouraged mob to call [facility] for assistance with next breast feed attempt. Discouraged paci, bottles and pumping for the first few weeks. Encouraged good hydration and nutrition. [facility] number on board for f/u. Response: Verbalized understanding and will call for f/u prn.

## 2021-11-29 NOTE — LACTATION NOTE
This note was copied from a baby's chart. Lactation Progress Note      Data:   Multip breast feeder who delivered yesterday. Mob states that baby has had a few good feeds using a nipple shield. BF first baby x 7 months without a shield. Nipple noted to be flat but protrude with stimulation. Action: Assisted with good position at breast. Mob shown how to express colostrum to encourage feed. Baby then rooting and a good deep latch was achieved after several attempts without the nipple shield. Observed LEON with SRS and AS. Breast feeding education provided. Encouraged to allow baby to go to breast ad linda and stressed importance of always achieving a good deep latch. Explained that the nipple shield should not be used if possible. Offered f/u LC support prn. Discouraged paci, bottles and pumping for the first few weeks. Encouraged good hydration and nutrition. Encouraged to call [facility] for f/u prn,    Response: Happy with breast feed. Verbalized and demonstrated understanding though may need f/u.

## 2021-11-29 NOTE — PROGRESS NOTES
Department of Obstetrics and Gynecology  Labor and Delivery  Attending Post Partum Progress Note      SUBJECTIVE:   24 y/o  female S/P uncomplicated Vaginal Delivery PPD #1. The pregnancy was complicated by late prenatal care, elevated blood pressures, and gestational hypertension. No current complaints are noted including headache, change in vision, fever, chills, chest pain, shortness of breath, nausea, vomiting, diarrhea or constipation. The patient denies lightheadedness, dizziness, urinary complaints and calf tenderness. Lochia is described as mild. The patient plans to breastfeed.     Objective:  Vitals:    21 2142 21 0410 21 0738 21 1551   BP: 131/89 123/76 130/85 128/79   Pulse: 100 77 81 76   Resp: 16 16 16 16   Temp: 98.8 °F (37.1 °C) 98.3 °F (36.8 °C) 98 °F (36.7 °C) 98 °F (36.7 °C)   TempSrc: Oral Oral Oral Oral   SpO2:       Weight:       Height:           GENERAL APPEARANCE: alert, well appearing, in no apparent distress, oriented to person, place and time, well hydrated  LUNGS: clear to auscultation, no wheezes, rales or rhonchi, symmetric air entry  HEART: regular rate and rhythm  ABDOMEN POSTPARTUM: soft, NT, ND   EXTREMITIES: no redness or tenderness in the calves or thighs, edema trace  SKIN: normal coloration and turgor, no rashes  NEUROLOGIC: alert, oriented, normal speech, no focal findings or movement disorder noted     Contains abnormal data Hemoglobin and hematocrit, blood  Order: [id number]   Status: Final result     Visible to patient: Yes (not seen)     Next appt: 2022 at 08:40 AM in Dermatology (Don Mcintosh MD)     0 Result Notes     Ref Range & Units 21 0848   Hemoglobin 12.0 - 16.0 g/dL 8.8 Low     Hematocrit 36.0 - 48.0 % 27.2 Low     Resulting Agency  [address] [facility]             Narrative  Performed by: [address] [facility]  Performed at:   [address]

## 2021-11-29 NOTE — PLAN OF CARE
PAIN  Goal: Patient's pain/discomfort is manageable  11/29/2021 0829 by Kaiden Roblero RN  Outcome: Ongoing  11/28/2021 2040 by Molly Torres RN  Outcome: Ongoing     Problem: KNOWLEDGE DEFICIT  Goal: Patient/S.O. demonstrates understanding of disease process, treatment plan, medications, and discharge instructions.   11/29/2021 0829 by Kaiden Roblero RN  Outcome: Ongoing  11/28/2021 2040 by Molly Torres RN  Outcome: Ongoing

## 2021-11-29 NOTE — ANESTHESIA POSTPROCEDURE EVALUATION
Department of Anesthesiology  Postprocedure Note    Patient: Shelley Baca  MRN: [id number]  YOB: 1995  Date of evaluation: 11/29/2021  Time:  6:35 AM     Procedure Summary     Date: 11/28/21 Room / Location:     Anesthesia Start: 2306 Anesthesia Stop: 2690    Procedure: Labor Analgesia Diagnosis:     Scheduled Providers:  Responsible Provider: Lakeshia Fuentes MD    Anesthesia Type: epidural ASA Status: 2 - Emergent          Anesthesia Type: epidural    Josie Phase I: Josie Score: 9    Josie Phase II: Josie Score: 9    Last vitals: Reviewed and per EMR flowsheets. Anesthesia Post Evaluation    Level of consciousness: awake and alert  Nausea & Vomiting: no nausea and no vomiting  Complications: no  Cardiovascular status: hemodynamically stable  Respiratory status: acceptable and room air  Hydration status: stable  Comments: Patient is s/p vaginal delivery with epidural analgesia. Progress notes, flow sheets, labs, and MARs reviewed. No apparent or reported anesthesia complications thus far.

## 2021-11-29 NOTE — PLAN OF CARE
Ongoing  2021 0711 by Jordan Tillman RN  Outcome: Ongoing  Goal: Absence of intrapartum hemorrhage signs and symptoms  Description: Absence of intrapartum hemorrhage signs and symptoms  2021 161 by Kulwant Garcia RN  Outcome: Completed  2021 07 by Kulwant Garcia RN  Outcome: Ongoing  2021 0711 by Jordan Tillman RN  Outcome: Ongoing     Problem: Infection - Intrapartum Infection:  Goal: Will show no infection signs and symptoms  Description: Will show no infection signs and symptoms  2021 161 by Kulwant Garcia RN  Outcome: Completed  2021 07 by Kulwant Garcia RN  Outcome: Ongoing  2021 07 by Jordan Tillman RN  Outcome: Ongoing     Problem: Labor Process - Prolonged:  Goal: Labor progression, first stage, within specified pattern  Description: Labor progression, first stage, within specified pattern  2021 by Kulwant Garcia RN  Outcome: Completed  2021 07 by Kulwant Garcia RN  Outcome: Ongoing  2021 07 by Jordan Tillman RN  Outcome: Ongoing  Goal: Labor progession, second stage, within specified pattern  Description: Labor progession, second stage, within specified pattern  2021 by Kulwant Garcia RN  Outcome: Completed  2021 07 by Kulwant Garcia RN  Outcome: Ongoing  2021 07 by Jordan Tillman RN  Outcome: Ongoing  Goal: Uterine contractions within specified parameters  Description: Uterine contractions within specified parameters  2021 by Kulwant Garcia RN  Outcome: Completed  2021 07 by Kulwant Garcia RN  Outcome: Ongoing  2021 07 by Jordan Tillman RN  Outcome: Ongoing     Problem:  Screening:  Goal: Ability to make informed decisions regarding treatment has improved  Description: Ability to make informed decisions regarding treatment has improved  2021 161 by Kulwant Garcia RN  Outcome: Completed  2021 by Kulwant Garcia RN  Outcome: Ongoing  11/28/2021 0711 by Adolfo Irizarry RN  Outcome: Ongoing     Problem: Pain - Acute:  Goal: Pain level will decrease  Description: Pain level will decrease  11/28/2021 1610 by Gabbie Contreras RN  Outcome: Completed  11/28/2021 0713 by Gabbie Contreras RN  Outcome: Ongoing  11/28/2021 0711 by Adolfo Irizarry RN  Outcome: Ongoing  Goal: Able to cope with pain  Description: Able to cope with pain  11/28/2021 1610 by Gabbie Contreras RN  Outcome: Completed  11/28/2021 0713 by Gabbie Contreras RN  Outcome: Ongoing  11/28/2021 0711 by Adolfo Irizarry RN  Outcome: Ongoing     Problem: Tissue Perfusion - Uteroplacental, Altered:  Goal: Absence of abnormal fetal heart rate pattern  Description: Absence of abnormal fetal heart rate pattern  11/28/2021 1610 by Gabbie Contreras RN  Outcome: Completed  11/28/2021 0713 by Gabbie Contreras RN  Outcome: Ongoing  11/28/2021 0711 by Adolfo Irizarry RN  Outcome: Ongoing     Problem: Urinary Retention:  Goal: Experiences of bladder distention will decrease  Description: Experiences of bladder distention will decrease  11/28/2021 1610 by Gabbie Contrersa, RN  Outcome: Completed  11/28/2021 0713 by Gabbie Contreras RN  Outcome: Ongoing  11/28/2021 0711 by Adolfo Irizarry RN  Outcome: Ongoing  Goal: Urinary elimination within specified parameters  Description: Urinary elimination within specified parameters  11/28/2021 1610 by Gabbie Contreras, RN  Outcome: Completed  11/28/2021 0713 by Gabbie Contreras RN  Outcome: Ongoing  11/28/2021 0711 by Adolfo Irizarry RN  Outcome: Ongoing     Problem: Pain:  Goal: Pain level will decrease  Description: Pain level will decrease  11/28/2021 1610 by Gabbie Contreras, RN  Outcome: Completed  11/28/2021 0713 by Gabbie Contreras RN  Outcome: Ongoing  11/28/2021 0711 by Adolfo Irizarry RN  Outcome: Ongoing  Goal: Control of acute pain  Description: Control of acute pain  11/28/2021 1610 by Gabbie Contreras, RN  Outcome: Completed  11/28/2021 0713 by Dee Givens Adelia Bob RN  Outcome: Ongoing  11/28/2021 0711 by Guillermina Tariq RN  Outcome: Ongoing  Goal: Control of chronic pain  Description: Control of chronic pain  11/28/2021 1610 by Yanira Bermudez RN  Outcome: Completed  11/28/2021 0713 by Yanira Bermudez RN  Outcome: Ongoing  11/28/2021 0711 by Guillermina Tariq RN  Outcome: Ongoing     Problem: VAGINAL DELIVERY - RECOVERY AND POST PARTUM  Goal: Positive Mother-Baby interactions are observed  Outcome: Completed

## 2021-11-30 VITALS
WEIGHT: 150 LBS | BODY MASS INDEX: 27.6 KG/M2 | HEIGHT: 62 IN | RESPIRATION RATE: 16 BRPM | TEMPERATURE: 98 F | OXYGEN SATURATION: 100 % | DIASTOLIC BLOOD PRESSURE: 94 MMHG | HEART RATE: 72 BPM | SYSTOLIC BLOOD PRESSURE: 137 MMHG

## 2021-11-30 PROCEDURE — 99024 POSTOP FOLLOW-UP VISIT: CPT | Performed by: OBSTETRICS & GYNECOLOGY

## 2021-11-30 PROCEDURE — 6370000000 HC RX 637 (ALT 250 FOR IP): Performed by: OBSTETRICS & GYNECOLOGY

## 2021-11-30 RX ORDER — DOCUSATE SODIUM 100 MG/1
100 CAPSULE, LIQUID FILLED ORAL 2 TIMES DAILY PRN
Qty: 30 CAPSULE | Refills: 0 | Status: SHIPPED | OUTPATIENT
Start: 2021-11-30

## 2021-11-30 RX ORDER — IBUPROFEN 800 MG/1
800 TABLET ORAL EVERY 8 HOURS PRN
Qty: 40 TABLET | Refills: 1 | Status: SHIPPED | OUTPATIENT
Start: 2021-11-30

## 2021-11-30 RX ADMIN — MOXIFLOXACIN HYDROCHLORIDE 800 MG: 400 TABLET, FILM COATED ORAL at 00:51

## 2021-11-30 RX ADMIN — DOCUSATE SODIUM 100 MG: 100 CAPSULE ORAL at 09:37

## 2021-11-30 RX ADMIN — MOXIFLOXACIN HYDROCHLORIDE 800 MG: 400 TABLET, FILM COATED ORAL at 09:38

## 2021-11-30 RX ADMIN — DOCUSATE SODIUM 100 MG: 100 CAPSULE ORAL at 00:51

## 2021-11-30 ASSESSMENT — PAIN SCALES - GENERAL
PAINLEVEL_OUTOF10: 0
PAINLEVEL_OUTOF10: 0

## 2021-11-30 NOTE — PLAN OF CARE
Problem: VAGINAL DELIVERY - RECOVERY AND POST PARTUM  Goal: Vital signs are medically acceptable  11/30/2021 1136 by Sherry Reyes RN  Outcome: Completed  11/30/2021 0743 by Sherry Reyes RN  Outcome: Ongoing  Goal: Patient will remain free of falls  11/30/2021 1136 by Sherry Reyes RN  Outcome: Completed  11/30/2021 0743 by Sherry Reyes RN  Outcome: Ongoing  Goal: Fundus firm at midline  11/30/2021 1136 by Sherry Reyes RN  Outcome: Completed  11/30/2021 0743 by Sherry Reyes RN  Outcome: Ongoing  Goal: Breasts are soft with nipple integrity intact  11/30/2021 1136 by Sherry Reyes RN  Outcome: Completed  11/30/2021 0743 by Sherry Reyes RN  Outcome: Ongoing  Goal: Demonstrates appropriate breast feeding techniques  11/30/2021 1136 by Sherry Reyes RN  Outcome: Completed  11/30/2021 0743 by Sherry Reyes RN  Outcome: Ongoing     Problem: PAIN  Goal: Patient's pain/discomfort is manageable  11/30/2021 1136 by Sherry Reyes RN  Outcome: Completed  11/30/2021 0743 by Sherry Reyes RN  Outcome: Ongoing     Problem: KNOWLEDGE DEFICIT  Goal: Patient/S.O. demonstrates understanding of disease process, treatment plan, medications, and discharge instructions.   11/30/2021 1136 by Sherry Reyes RN  Outcome: Completed  11/30/2021 0743 by Sherry Reyes RN  Outcome: Ongoing

## 2021-11-30 NOTE — DISCHARGE SUMMARY
Department of Obstetrics and Gynecology  Postpartum Discharge Summary      Admit Date: 2021    Admit Diagnosis: Gestational (pregnancy-induced) hypertension without significant proteinuria, complicating childbirth [I58.5]    Discharge Date: 21    Condition at Discharge: good    Discharge Diagnoses: spontaneous vaginal delivery    Discharge Disposition:  Home    Service: Obstetrics    Postpartum complications: none     Hospital Course: uncomplicated     Data:  Information for the patient's :  Niru Cha [[id number]]        Weight   Information for the patient's :  Niru Cha [[id number]]        Apgars   Information for the patient's :  Niru Cha [[id number]]         Disposition of Baby:  Home with mother      Current Discharge Medication List      START taking these medications    Details   ibuprofen (ADVIL;MOTRIN) 800 MG tablet Take 1 tablet by mouth every 8 hours as needed for Pain  Qty: 40 tablet, Refills: 1      docusate sodium (COLACE) 100 MG capsule Take 1 capsule by mouth 2 times daily as needed for Constipation  Qty: 30 capsule, Refills: 0         CONTINUE these medications which have NOT CHANGED    Details   Prenatal Multivit-Min-Fe-FA (PRENATAL 1 + IRON PO) Take 1 tablet by mouth daily      Blood Pressure KIT 1 kit by Does not apply route once for 1 dose  Qty: 1 kit, Refills: 0             Electronically signed by Zachariah Pressley MD on 2021 at 7:55 AM

## 2021-11-30 NOTE — FLOWSHEET NOTE
Discharge Phone Call Log  Patient Name: Velvet Morales     [facility] Care Provider: Tamara Gonzales DO Discharge Date: 2021    Disposition of baby:    Phone Number: [phone] (home)     Attempts to Contact:  Date:    Nurse  Date:    Nurse  Date:    Nurse    1. Now that you are at home is your pain being well controlled? Y/N   What pain reducing measures are you using? ____________________________________        Information for the patient's :  Marie Valentin Girl Lex Wong [[id number]]   Delivery Method: Vaginal, Spontaneous     2. Are you currently  having any infant feeding issues? Y/N _____________________________ If yes, please explain: __________________________________________________________________  3. If breastfeeding, were you satisfied with the breastfeeding support services offered? Y/N  4.  Have you had to supplement? Y/N If yes, please explain: _____________________________________________________       Did you supplement while in the hospital, or begin formula supplementation at home?________________________________________  5. Did your OB provider offer you information about the benefits of breastfeeding during your prenatal visits? Y/N  6.  Have you made or have you already had your first appointment with the baby's doctor? Y/N If no, do you know when to schedule it? Y/N   7.  Have you scheduled your follow-up appointment? Y/N  If no, do you know when to schedule it? Y/N  8. Did staff discuss safe sleep during your stay? Y/N  Did you see the wall cling posted in your room explaining how to keep you and your baby safe? Y/N  10. Did your nurses and physicians include you in the plan of care, communicating with you respectfully? Y/N If no, please explain __________________________  11. Is there anyone in particular you would like to mention who provided care for you? ________________________________  12. Did your discharge occur in a timely manner?   Y/N If no, please explain __________________________  13. Do you have any other questions or concerns I can address today?  Y/N  __________________________________________________      Teaching During interview :_____________________________________________  ___________________________RN       Date:______________Time:________________

## 2021-11-30 NOTE — PROGRESS NOTES
Post Partum Progress Note    Subjective:  Denise Hanson is a 23 y.o. [id number] status-post  uncomplicated Vaginal Delivery. The pregnancy was complicated by late prenatal care, elevated blood pressures, and gestational hypertension. Patient is doing well with no concerns. Pain is well controlled with current regimen. Lochia mild. Tolerating regular diet without difficulty. Ambulating without difficulty, denies dizziness on standing. Voiding without difficulty. She is breast feeding. Denies chest pain, SOB, or leg pain. Objective:  /72   Pulse 68   Temp 98.3 °F (36.8 °C) (Oral)   Resp 16   Ht 5' 2\" (1.575 m)   Wt 150 lb (68 kg)   LMP 03/10/2021   SpO2 100%   Breastfeeding Unknown   BMI 27.44 kg/m²     GENERAL APPEARANCE: alert, well appearing, in no apparent distress  LUNGS: clear to auscultation, no wheezes, rales or rhonchi, symmetric air entry  HEART: regular rate and rhythm  ABDOMEN POSTPARTUM: abdomen soft, nontender, fundus firm  EXTREMITIES: no redness or tenderness in the calves or thighs, no edema    No intake/output data recorded.     Pertinent Labs:   Admission on 11/27/2021   Component Date Value Ref Range Status    WBC 11/27/2021 10.9  4.0 - 11.0 K/uL Final    RBC 11/27/2021 4.18  4.00 - 5.20 M/uL Final    Hemoglobin 11/27/2021 11.1* 12.0 - 16.0 g/dL Final    Hematocrit 11/27/2021 33.4* 36.0 - 48.0 % Final    MCV 11/27/2021 79.9* 80.0 - 100.0 fL Final    MCH 11/27/2021 26.5  26.0 - 34.0 pg Final    MCHC 11/27/2021 33.2  31.0 - 36.0 g/dL Final    RDW 11/27/2021 13.3  12.4 - 15.4 % Final    Platelets 13/45/4961 177  135 - 450 K/uL Final    MPV 11/27/2021 8.7  5.0 - 10.5 fL Final    Neutrophils % 11/27/2021 72.4  % Final    Lymphocytes % 11/27/2021 20.9  % Final    Monocytes % 11/27/2021 6.3  % Final    Eosinophils % 11/27/2021 0.3  % Final    Basophils % 11/27/2021 0.1  % Final    Neutrophils Absolute 11/27/2021 7.9* 1.7 - 7.7 K/uL Final    Lymphocytes Absolute 11/27/2021 2.3  1.0 - 5.1 K/uL Final    Monocytes Absolute 11/27/2021 0.7  0.0 - 1.3 K/uL Final    Eosinophils Absolute 11/27/2021 0.0  0.0 - 0.6 K/uL Final    Basophils Absolute 11/27/2021 0.0  0.0 - 0.2 K/uL Final    ABO/Rh 11/27/2021 O POS   Final    Antibody Screen 11/27/2021 NEG   Final    Amphetamine Screen, Urine 11/27/2021 Neg  Negative <1000ng/mL Final    Barbiturate Screen, Ur 11/27/2021 Neg  Negative <200 ng/mL Final    Benzodiazepine Screen, Urine 11/27/2021 Neg  Negative <200 ng/mL Final    Cannabinoid Scrn, Ur 11/27/2021 Neg  Negative <50 ng/mL Final    Cocaine Metabolite Screen, Urine 11/27/2021 Neg  Negative <300 ng/mL Final    Opiate Scrn, Ur 11/27/2021 Neg  Negative <300 ng/mL Final    Comment: \"Therapeutic levels of pain medication, especially oxycontin and synthetic  opioids, may not be detected by this Methodology. Pain management screen  panel  Drug panel-PM-Hi Res Ur, Interp (PAIN) should be considered for drug  monitoring \".  PCP Screen, Urine 11/27/2021 Neg  Negative <25 ng/mL Final    Methadone Screen, Urine 11/27/2021 Neg  Negative <300 ng/mL Final    Propoxyphene Scrn, Ur 11/27/2021 Neg  Negative <300 ng/mL Final    Oxycodone Urine 11/27/2021 Neg  Negative <100 ng/ml Final    Buprenorphine Urine 11/27/2021 Neg  Negative <5 ng/ml Final    pH, UA 11/27/2021 6.0   Final    Comment: Urine pH less than 5.0 or greater than 8.0 may indicate sample adulteration. Another sample should be collected if clinically  indicated.  Drug Screen Comment: 11/27/2021 see below   Final    Comment: This method is a screening test to detect only these drug  classes as part of a medical workup. Confirmatory testing  by another method should be ordered if clinically indicated.       Total Syphillis IgG/IgM 11/27/2021 Non-Reactive  Non-reactive Final    Sodium 11/27/2021 135* 136 - 145 mmol/L Final    Potassium 11/27/2021 3.8  3.5 - 5.1 mmol/L Final    Chloride 11/27/2021 103  99 - 110 mmol/L Final    CO2 11/27/2021 21  21 - 32 mmol/L Final    Anion Gap 11/27/2021 11  3 - 16 Final    Glucose 11/27/2021 90  70 - 99 mg/dL Final    BUN 11/27/2021 6* 7 - 20 mg/dL Final    CREATININE 11/27/2021 <0.5* 0.6 - 1.1 mg/dL Final    GFR Non- 11/27/2021 >60  >60 Final    Comment: >60 mL/min/1.73m2 EGFR, calc. for ages 25 and older using the  MDRD formula (not corrected for weight), is valid for stable  renal function.  GFR  11/27/2021 >60  >60 Final    Comment: Chronic Kidney Disease: less than 60 ml/min/1.73 sq.m. Kidney Failure: less than 15 ml/min/1.73 sq.m. Results valid for patients 18 years and older.  Calcium 11/27/2021 9.4  8.3 - 10.6 mg/dL Final    Total Protein 11/27/2021 6.1* 6.4 - 8.2 g/dL Final    Albumin 11/27/2021 3.5  3.4 - 5.0 g/dL Final    Albumin/Globulin Ratio 11/27/2021 1.3  1.1 - 2.2 Final    Total Bilirubin 11/27/2021 <0.2  0.0 - 1.0 mg/dL Final    Alkaline Phosphatase 11/27/2021 138* 40 - 129 U/L Final    ALT 11/27/2021 7* 10 - 40 U/L Final    AST 11/27/2021 18  15 - 37 U/L Final    LD 11/27/2021 142  100 - 190 U/L Final    Uric Acid, Serum 11/27/2021 3.8  2.6 - 6.0 mg/dL Final    C. trachomatis DNA ,Urine 11/27/2021 Negative  Negative Final    N. gonorrhoeae DNA, Urine 11/27/2021 Negative  Negative Final    HIV, External Result 05/20/2021 Non-reactive   Final    LC,RN    RPR, External Result 05/20/2021 Non-reactive   Final    LC,RN    Hep B, External Result 05/20/2021 Negative   Final    LEXXRN    Rubella Titer, External Result 05/20/2021 Immune   Final    LEXX,RN    Hemoglobin 11/29/2021 8.8* 12.0 - 16.0 g/dL Final    Hematocrit 11/29/2021 27.2* 36.0 - 48.0 % Final    Hemoglobin 11/29/2021 9.3* 12.0 - 16.0 g/dL Final    Hematocrit 11/29/2021 28.1* 36.0 - 48.0 % Final       Assessment/Plan:  Amari Dasilva is a 23 y.o. [id number] status-post  uncomplicated Vaginal Delivery.      * PPD #2: doing well, routine care  - Hgb 9.3 (8.8 yesterday AM), VSS, asymptomatic,   * Infant: F, 9/9, 3119g, at bedside  * gHTN:  - normotensive over last 24 hours, asymptomatic  * Labs: O+/ab-, RI, GBS neg    Dispo: d/c home today    Maddie Loza MD  [facility]

## 2021-11-30 NOTE — PLAN OF CARE
Problem: VAGINAL DELIVERY - RECOVERY AND POST PARTUM  Goal: Vital signs are medically acceptable  11/29/2021 2123 by Cara Chanel RN  Outcome: Ongoing  11/29/2021 0829 by Compa Cosby RN  Outcome: Ongoing  Goal: Patient will remain free of falls  11/29/2021 2123 by Cara Chanel RN  Outcome: Ongoing  11/29/2021 0829 by Compa Cosby RN  Outcome: Ongoing  Goal: Fundus firm at midline  11/29/2021 2123 by Cara Chanel RN  Outcome: Ongoing  11/29/2021 0829 by Compa Cosby RN  Outcome: Ongoing  Goal: Breasts are soft with nipple integrity intact  11/29/2021 2123 by Cara Chanel RN  Outcome: Ongoing  11/29/2021 0829 by Compa Cosby RN  Outcome: Ongoing  Goal: Demonstrates appropriate breast feeding techniques  11/29/2021 2123 by Cara Chanel RN  Outcome: Ongoing  11/29/2021 0829 by Compa Cosby RN  Outcome: Ongoing     Problem: PAIN  Goal: Patient's pain/discomfort is manageable  11/29/2021 2123 by Cara Chanel RN  Outcome: Ongoing  11/29/2021 0829 by Compa Cosby RN  Outcome: Ongoing     Problem: KNOWLEDGE DEFICIT  Goal: Patient/S.O. demonstrates understanding of disease process, treatment plan, medications, and discharge instructions.   11/29/2021 2123 by Craa Chanel RN  Outcome: Ongoing  11/29/2021 0829 by Compa Cosby RN  Outcome: Ongoing     Problem: VAGINAL DELIVERY - RECOVERY AND POST PARTUM  Goal: Moderate rubra without clots, no purulent discharge, no foul smelling lochia  11/29/2021 2123 by Cara Chanel RN  Outcome: Completed  11/29/2021 0829 by Compa Cosby RN  Outcome: Ongoing  Goal: Empties bladder  11/29/2021 2123 by Cara Chanel RN  Outcome: Completed  11/29/2021 0829 by Compa Cosby RN  Outcome: Ongoing  Goal: Verbalizes understanding of normal bowel function resumption  11/29/2021 2123 by Cara Chanel RN  Outcome: Completed  11/29/2021 0829 by Compa Cosby RN  Outcome: Ongoing  Goal: Edema will be absent or minimal  11/29/2021 2123 by Cara Chanel, RN  Outcome: Completed  11/29/2021 3079 by Ritu Arreola RN  Outcome: Ongoing  Goal: Appropriate behavior observed  11/29/2021 2123 by Ana Harris RN  Outcome: Completed  11/29/2021 0829 by Ritu Arreola RN  Outcome: Ongoing  Goal: Perineum intact without discharge or hematoma  11/29/2021 2123 by Ana Harris RN  Outcome: Completed  11/29/2021 0829 by Ritu Arreola RN  Outcome: Ongoing  Goal: Ambulates independently  11/29/2021 2123 by Ana Harris RN  Outcome: Completed  11/29/2021 0829 by Ritu Arreola RN  Outcome: Ongoing

## 2021-11-30 NOTE — LACTATION NOTE
This note was copied from a baby's chart. Lactation Progress Note      Data:   F/U on multip breast feeder who will be d/c home today. Mob states that baby has been feeding well. Output and wt loss are WNL. Breasts are filling. Baby is елена positive and jaundice level is elevated. MD is requesting that mob add pumping and supplementing with EBM after d/c. Baby currently at breast with a good deep latch with SRS and AS using the nipple shield. Nipple joelle in shield after feed. Action: Reassured of good position and latch. Explained that as nipple becomes joelle in shield she could begin weaning from shield. Encouraged to continue to breast feed ad linda. To pump and offer pumped milk per syringe or slow flow bottle every 3 hours. To continue to pump and supplement until milk is in well and jaundice is resolved. Education provided regarding well fed baby check list, engorgement treatment and milk collection and storage. Encouraged to call [de-identified] or Outpatient [facility] clinic for f/u prn. Response: Verbalized understanding and comfortable with feeding plan for d/c.

## 2021-12-20 ENCOUNTER — TELEPHONE (OUTPATIENT)
Dept: DERMATOLOGY | Age: 26
End: 2021-12-20

## 2021-12-20 NOTE — TELEPHONE ENCOUNTER
Called patient. Patient did not answer. Left message for patient to return call and to reschedule visit. Patient needs to be moved to a Thursday.

## 2021-12-27 NOTE — TELEPHONE ENCOUNTER
Called patient. Patient did not answer. Left message for patient to return call and to reschedule visit. This is the second attempt to contact the patient to reschedule.

## 2021-12-29 ENCOUNTER — POSTPARTUM VISIT (OUTPATIENT)
Dept: OBGYN CLINIC | Age: 26
End: 2021-12-29

## 2021-12-29 VITALS
SYSTOLIC BLOOD PRESSURE: 122 MMHG | TEMPERATURE: 98.3 F | DIASTOLIC BLOOD PRESSURE: 90 MMHG | BODY MASS INDEX: 22.31 KG/M2 | HEART RATE: 83 BPM | WEIGHT: 122 LBS

## 2021-12-29 DIAGNOSIS — Z30.8 ENCOUNTER FOR OTHER CONTRACEPTIVE MANAGEMENT: ICD-10-CM

## 2021-12-29 DIAGNOSIS — O13.3 GESTATIONAL HYPERTENSION, THIRD TRIMESTER: ICD-10-CM

## 2021-12-29 PROCEDURE — 0503F POSTPARTUM CARE VISIT: CPT | Performed by: OBSTETRICS & GYNECOLOGY

## 2021-12-30 NOTE — PROGRESS NOTES
OB Postpartum Visit    HPI:   Patient is a 23 y.o. [id number] s/p vaginal delivery here for her postpartum visit:     Pt doing well today. Bleeding mild lochia. Bowel function is normal. Bladder function is normal.   Patient is not sexually active. Contraception method is Interested in [facility]. EDPS: negative. Baby has been doing well without problems. Baby is feeding breast.     Review of Systems - The following ROS was otherwise negative, except as noted in the HPI: constitutional, respiratory, cardiovascular, gastrointestinal, genitourinary    OB History  OB History    Para Term  AB Living   2 2 2 0 0 2   SAB IAB Ectopic Molar Multiple Live Births   0 0 0 0 0 2      # Outcome Date GA Lbr Shahid/2nd Weight Sex Delivery Anes PTL Lv   2 Term 21 37w4d 02:46 / 00:16 6 lb 14 oz (3.119 kg) F Vag-Spont EPI N KIRK      Birth CommentsChamp Lover     ID: [id number]     HUGS: 179   1 Term 10/24/17 40w4d   F Vag-Spont EPI N KIRK       Medications:  Current Outpatient Medications on File Prior to Visit   Medication Sig Dispense Refill    Prenatal Multivit-Min-Fe-FA (PRENATAL 1 + IRON PO) Take 1 tablet by mouth daily      ibuprofen (ADVIL;MOTRIN) 800 MG tablet Take 1 tablet by mouth every 8 hours as needed for Pain (Patient not taking: Reported on 2021) 40 tablet 1    docusate sodium (COLACE) 100 MG capsule Take 1 capsule by mouth 2 times daily as needed for Constipation 30 capsule 0    Blood Pressure KIT 1 kit by Does not apply route once for 1 dose 1 kit 0     No current facility-administered medications on file prior to visit.         Objective:  BP (!) 122/90 (Site: Left Upper Arm, Position: Sitting, Cuff Size: Medium Adult)   Pulse 83   Temp 98.3 °F (36.8 °C) (Infrared)   Wt 122 lb (55.3 kg)   LMP 03/10/2021   BMI 22.31 kg/m²   General: Alert, well appearing, no acute distress  Lungs: Resp effort normal   CV: Regular rate  Abdomen: Soft, nontender, nondistended   Pelvic: Deferred  Extremities: No redness or tenderness, neg Maddi's sign  Osteopathic: no TART changes    Assessment/Plan:   Diagnosis Orders   1. Encounter for postpartum visit     2.  (normal spontaneous vaginal delivery)     3. Patient is a currently breast-feeding mother     3. Encounter for other contraceptive management     5. Gestational hypertension, third trimester        1. Post Partum:   Doing well, meeting post partum milestones  2. Family Planning:   Contraception options reviewed, interested in [facility] signed, will bring back for placement  3. Health Maintenance:   a. Last pap smear: Pap NILM 2021   b. Plan for repeat     Follow Up:   Return in about 4 weeks (around 2022) for IUD Insertion.      Lyndsey Sherwood,

## 2022-01-14 ENCOUNTER — TELEPHONE (OUTPATIENT)
Dept: OBGYN CLINIC | Age: 27
End: 2022-01-14

## 2022-01-14 NOTE — TELEPHONE ENCOUNTER
Patient calling to check the status of he Liletta IUD, patient said she signed paperwork at last visit? No forms scanned into media ?

## 2022-01-16 NOTE — TELEPHONE ENCOUNTER
I filled them out with her in office and placed them on MA desk -- if we are not able to find them, please ask if she would mind returning to sign forms again. I apologize!     Bhumi

## 2022-01-18 NOTE — TELEPHONE ENCOUNTER
Called lvm patient will need to stop back in office to re sgin the Vandana Ruffing IUD forms. Forms are not scanned into media.      FYI

## 2022-01-19 NOTE — TELEPHONE ENCOUNTER
Patient called and stated the insurance company had received an order from [facility] and it would be delivered the 24th of January.      Routing to Dr. Vero Cartagena and JEAN CARLOS as Danni Felix

## 2022-02-10 ENCOUNTER — TELEPHONE (OUTPATIENT)
Dept: DERMATOLOGY | Age: 27
End: 2022-02-10

## 2022-02-10 NOTE — TELEPHONE ENCOUNTER
Pt of    Pt was schedule 2/3 for Re-evaluate mole. Pt is called back to get rescheduled. Please call back to discuss.    C/B# [phone]

## 2022-02-24 NOTE — TELEPHONE ENCOUNTER
Called patient. Patient did not answer. Left message for patient to return call and to schedule visit. Please reschedule this patient.

## 2022-03-02 ENCOUNTER — TELEPHONE (OUTPATIENT)
Dept: DERMATOLOGY | Age: 27
End: 2022-03-02

## 2022-03-02 NOTE — TELEPHONE ENCOUNTER
[phone] patient is requesting a return call to reschedule 3/2 appt canceled due to stomach bug. Please advise. Thank you!

## 2022-08-15 ENCOUNTER — TELEPHONE (OUTPATIENT)
Dept: OBGYN CLINIC | Age: 27
End: 2022-08-15

## 2022-08-15 ENCOUNTER — OFFICE VISIT (OUTPATIENT)
Dept: OBGYN CLINIC | Age: 27
End: 2022-08-15
Payer: COMMERCIAL

## 2022-08-15 VITALS
SYSTOLIC BLOOD PRESSURE: 130 MMHG | WEIGHT: 113.6 LBS | BODY MASS INDEX: 20.78 KG/M2 | DIASTOLIC BLOOD PRESSURE: 82 MMHG | TEMPERATURE: 98.3 F | HEART RATE: 104 BPM

## 2022-08-15 DIAGNOSIS — Z32.02 NEGATIVE PREGNANCY TEST: ICD-10-CM

## 2022-08-15 DIAGNOSIS — Z30.015 ENCOUNTER FOR INITIAL PRESCRIPTION OF VAGINAL RING HORMONAL CONTRACEPTIVE: Primary | ICD-10-CM

## 2022-08-15 DIAGNOSIS — R30.0 DYSURIA: ICD-10-CM

## 2022-08-15 LAB
BACTERIA: ABNORMAL /HPF
BILIRUBIN URINE: NEGATIVE
BLOOD, URINE: ABNORMAL
CLARITY: ABNORMAL
COLOR: YELLOW
EPITHELIAL CELLS, UA: 2 /HPF (ref 0–5)
GLUCOSE URINE: NEGATIVE MG/DL
HYALINE CASTS: 0 /LPF (ref 0–8)
KETONES, URINE: NEGATIVE MG/DL
LEUKOCYTE ESTERASE, URINE: ABNORMAL
MICROSCOPIC EXAMINATION: YES
NITRITE, URINE: POSITIVE
PH UA: 7 (ref 5–8)
PROTEIN UA: ABNORMAL MG/DL
RBC UA: 5 /HPF (ref 0–4)
SPECIFIC GRAVITY UA: 1.02 (ref 1–1.03)
URINE TYPE: ABNORMAL
UROBILINOGEN, URINE: 0.2 E.U./DL
WBC UA: 283 /HPF (ref 0–5)

## 2022-08-15 PROCEDURE — 81025 URINE PREGNANCY TEST: CPT | Performed by: OBSTETRICS & GYNECOLOGY

## 2022-08-15 PROCEDURE — 99212 OFFICE O/P EST SF 10 MIN: CPT | Performed by: OBSTETRICS & GYNECOLOGY

## 2022-08-15 RX ORDER — ETONOGESTREL AND ETHINYL ESTRADIOL 11.7; 2.7 MG/1; MG/1
1 INSERT, EXTENDED RELEASE VAGINAL
Qty: 3 EACH | Refills: 5 | Status: SHIPPED | OUTPATIENT
Start: 2022-08-15

## 2022-08-15 RX ORDER — MULTIVIT-MIN/IRON/FOLIC ACID/K 18-600-40
CAPSULE ORAL
COMMUNITY

## 2022-08-15 NOTE — PROGRESS NOTES
Offered alternatives to St Ulric IUD including Cadence Pino, Wilian, ParaGard--patient states she will keep trying to discuss with her insurance for now. Reviewed that we will place once in office and on menses. - UPT neg -- Nuvaring sent in  / use precautions reviewed     Follow Up   Return for Follow Up as needed / ? IUD placement .     Dayo Hua, DO

## 2022-08-17 LAB
ORGANISM: ABNORMAL
URINE CULTURE, ROUTINE: ABNORMAL

## 2022-08-17 NOTE — TELEPHONE ENCOUNTER
Called [facility] special[facility] pharmacy to check on IUD, [facility] was not in network with patients insurance company so they transferred it to [address] [place] [address] they canceled the order due to not being able to confirm shipment. Called patient left her a message she will need to come in and sign new forms since the old order is now canceled.        DARÍO

## 2023-02-28 ENCOUNTER — OFFICE VISIT (OUTPATIENT)
Dept: OBGYN CLINIC | Age: 28
End: 2023-02-28

## 2023-02-28 VITALS
HEART RATE: 91 BPM | HEIGHT: 62 IN | SYSTOLIC BLOOD PRESSURE: 116 MMHG | BODY MASS INDEX: 21.38 KG/M2 | WEIGHT: 116.2 LBS | TEMPERATURE: 98.9 F | DIASTOLIC BLOOD PRESSURE: 72 MMHG

## 2023-02-28 DIAGNOSIS — Z87.59 HISTORY OF GESTATIONAL HYPERTENSION: ICD-10-CM

## 2023-02-28 DIAGNOSIS — Z12.4 PAP SMEAR FOR CERVICAL CANCER SCREENING: ICD-10-CM

## 2023-02-28 DIAGNOSIS — Z20.2 POSSIBLE EXPOSURE TO STD: ICD-10-CM

## 2023-02-28 DIAGNOSIS — N91.2 AMENORRHEA: ICD-10-CM

## 2023-02-28 DIAGNOSIS — Z01.419 WOMEN'S ANNUAL ROUTINE GYNECOLOGICAL EXAMINATION: Primary | ICD-10-CM

## 2023-02-28 DIAGNOSIS — Z32.01 POSITIVE PREGNANCY TEST: ICD-10-CM

## 2023-02-28 NOTE — PROGRESS NOTES
[facility]   Annual Exam     CC:   Chief Complaint   Patient presents with    Amenorrhea        HPI:  33 y.o. [id number] presents for her gynecologic exam.   She complains of Amenorrhea. Patient's last menstrual period was 2023. Ishmael Rich Has had a (+) home pregnancy test. Denies any VB / Cramping since that time. Admits to mild nausea but no vomiting and some fatigue.  (+) prenatal vitamin. Previous pregnancies: G1:  @ 40w4d, G2:  @ 37w4d, Gestational HTN, Late prenatal care. Both girls! Screening:  Last pap smear: Pap NILM 2021  History of abnormal pap smears: Denies  STI History: denies      Review of Systems:   Review of Systems   Constitutional:  Negative for activity change, appetite change and fatigue. Eyes:  Negative for photophobia and visual disturbance. Respiratory:  Negative for shortness of breath. Cardiovascular:  Negative for chest pain, palpitations and leg swelling. Gastrointestinal:  Positive for nausea. Negative for abdominal pain and vomiting. Genitourinary:  Negative for difficulty urinating.        (+) absence of Menses   (+) breast tenderness    Skin:  Negative for color change. Neurological:  Negative for dizziness and numbness. Hematological:  Negative for adenopathy. Does not bruise/bleed easily. Psychiatric/Behavioral:  Negative for behavioral problems. The patient is not nervous/anxious. Primary Care Physician: No primary care provider on file.     Obstetric History  OB History    Para Term  AB Living   2 2 2 0 0 2   SAB IAB Ectopic Molar Multiple Live Births   0 0 0 0 0 2      # Outcome Date GA Lbr Shahid/2nd Weight Sex Delivery Anes PTL Lv   2 Term 21 37w4d 02:46 / 00:16 6 lb 14 oz (3.119 kg) F Vag-Spont EPI N KIRK      Birth Danyel Romero     ID: [id number]     HUGS: 80   1 Term 10/24/17 40w4d   F Vag-Spont EPI N KIRK       Gynecologic History  Menstrual History:  Menarche: early teens  LMP: Patient's last menstrual period was 01/05/2023. Menstrual Period: regular    Sexual History:  Contraception: see above  Currently IS sexually active  Denies sexual problems    Medical History:  Past Medical History:   Diagnosis Date    Elevated blood pressure reading in office without diagnosis of hypertension     Gestational hypertension, third trimester     Glucose intolerance of pregnancy     failed 1 hr    Prenatal care, subsequent pregnancy in third trimester 9/12/2021    UTI in pregnancy, antepartum, first trimester 9/12/2021       Medications:  Current Outpatient Medications   Medication Sig Dispense Refill    Prenatal Multivit-Min-Fe-FA (PRENATAL 1 + IRON PO) Take 1 tablet by mouth daily      Cholecalciferol (VITAMIN D) 50 MCG (2000 UT) CAPS capsule Take by mouth (Patient not taking: Reported on 2/28/2023)      etonogestrel-ethinyl estradiol (NUVARING) 0.12-0.015 MG/24HR vaginal ring Place 1 each vaginally every 21 days Insert one (1) ring vaginally and leave in place for three (3) weeks, then remove for one (1) week. (Patient not taking: Reported on 2/28/2023) 3 each 5    ibuprofen (ADVIL;MOTRIN) 800 MG tablet Take 1 tablet by mouth every 8 hours as needed for Pain (Patient not taking: No sig reported) 40 tablet 1    docusate sodium (COLACE) 100 MG capsule Take 1 capsule by mouth 2 times daily as needed for Constipation (Patient not taking: Reported on 2/28/2023) 30 capsule 0    Blood Pressure KIT 1 kit by Does not apply route once for 1 dose 1 kit 0     No current facility-administered medications for this visit. Surgical History:  No past surgical history on file.     Allergies:  No Known Allergies    Family History:  Family History   Problem Relation Age of Onset    High Blood Pressure Maternal Grandmother     Heart Disease Maternal Grandfather     Stroke Maternal Grandfather        Social History:  Social History     Socioeconomic History    Marital status: Single   Tobacco Use    Smoking status: Never    Smokeless tobacco: Never   Vaping Use    Vaping Use: Never used   Substance and Sexual Activity    Alcohol use: Not Currently    Drug use: No    Sexual activity: Yes     Partners: Male       Objective: Body mass index is 21.25 kg/m². /72 (Site: Left Upper Arm, Position: Sitting, Cuff Size: Medium Adult)   Pulse 91   Temp 98.9 °F (37.2 °C) (Infrared)   Ht 5' 2\" (1.575 m)   Wt 116 lb 3.2 oz (52.7 kg)   LMP 01/05/2023   BMI 21.25 kg/m²   General: Alert, well appearing, no acute distress  Head: Normocephalic, atraumatic  Mouth: Mucous membranes moist, pharynx normal without lesions  Thyroid: No thyromegaly or masses present   Breasts: Deferred due to discomfort   Lungs: Respiratory effort normal   CV: Regular rate. Abdomen: Soft, nontender, nondistended   Pelvic:   External:  normal appearing genitalia without masses, tenderness or lesions  Vagina: moist, pink mucosa, without abnormal discharge or lesions  Cervix: no masses or lesions visualized, no cervical motion tenderness  Uterus: mobile, midline, no masses palpated  Adnexa: mobile, non-tender to palpation, without masses  Rectovaginal: deferred  Extremities: No redness or tenderness, neg Maddi's sign  Skin: Well perfused, normal coloration and turgor, no lesions or rashes visualized  Neuro: Alert, oriented, normal speech, no focal deficits, moves extremities appropriately  Osteopathic: No TART changes    Imaging:  Narrative   OBSTETRIC ULTRASOUND--1ST TRIMESTER       DATE: 02/28/2023       PHYSICIAN: MAURA Landaverde.        SONOGRAPHER: Avery Verma [facility]       INDICATION: Amenorrhea       TYPE OF SCAN:  vaginal       FINDINGS:         The cul de sac is normal.  The cervix is normal.  The uterus is gravid. The uterus measures 10.27 cm x 6.64 cm x 5.40 cm. No uterine anomalies are    evident. The right ovary is present. The right ovary measures 2.79 cm x 2.01 cm x    2.16 cm. The left ovary is present.  The left ovary measures 1.65 cm x 1.22 cm x 1.21 cm. There is a single intrauterine pregnancy identified. A fetal pole is    noted with a CRL measuring 1.05 cm, consistent with gestational age of    9weeks and 4days and EDC of 10/16/2023. There is a 4 day discrepancy when    compared with the gestational age of 7weeks and 5days and EDC of    10/12/2023 set by [facility]DP (01/05/2023). Yolk sac is present and measures . 57    cm. Fetal cardiac activity is present at 130 bpm.            Impression        Single IUP with cardiac activity. Consistent with a gestational age of    7weeks and 5days and EDC of 10/12/2023 set by [facility] (01/05/2023). Imaging is limited secondary to bowel gas. Patient is well aware of the limitations of ultrasound in the detection of    anomalies. Rakesh Silva DO        Assessment/Plan:  33 y.o. [id number] presenting for her annual exam with (+) preg test:     Diagnosis Orders   1. Women's annual routine gynecological examination        2. Pap smear for cervical cancer screening  PAP SMEAR      3. Possible exposure to STD  Vaginal Pathogens Probes *A    C.trachomatis N.gonorrhoeae DNA      4. Amenorrhea        5. Positive pregnancy test  POCT urine pregnancy    Culture, Urine    Urinalysis with Microscopic      6. History of gestational hypertension           - reviewed US results with patient, questions answered   Single IUP with cardiac activity. Consistent with a gestational age of    7weeks and 5days and EDC of 10/12/2023 set by [facility]DP (01/05/2023). - continue prenatal vitamin  - recommend ASA at 12 weeks + HELLP labs / UPC at next visit   - reviewed genetic screening options -- undecided   - apt schedule, office policies and prenatal folder reviewed, questions or concerns addressed   - early OB return precautions reviewed     Follow Up  - Will call patient with results   -Return in about 4 weeks (around 3/28/2023) for Initial Prenatal Visit.     Rakesh Silva DO

## 2023-03-01 LAB
BACTERIA: ABNORMAL /HPF
BILIRUBIN URINE: NEGATIVE
BLOOD, URINE: NEGATIVE
CANDIDA SPECIES, DNA PROBE: NORMAL
CLARITY: ABNORMAL
COLOR: YELLOW
EPITHELIAL CELLS, UA: 4 /HPF (ref 0–5)
GARDNERELLA VAGINALIS, DNA PROBE: NORMAL
GLUCOSE URINE: NEGATIVE MG/DL
HYALINE CASTS: 0 /LPF (ref 0–8)
KETONES, URINE: NEGATIVE MG/DL
LEUKOCYTE ESTERASE, URINE: NEGATIVE
MICROSCOPIC EXAMINATION: YES
NITRITE, URINE: NEGATIVE
PH UA: 7 (ref 5–8)
PROTEIN UA: NEGATIVE MG/DL
RBC UA: 2 /HPF (ref 0–4)
SPECIFIC GRAVITY UA: 1.02 (ref 1–1.03)
TRICHOMONAS VAGINALIS DNA: NORMAL
URINE CULTURE, ROUTINE: NORMAL
URINE TYPE: ABNORMAL
UROBILINOGEN, URINE: 0.2 E.U./DL
WBC UA: 2 /HPF (ref 0–5)

## 2023-03-02 LAB
C TRACH DNA GENITAL QL NAA+PROBE: NEGATIVE
N. GONORRHOEAE DNA: NEGATIVE

## 2023-03-03 PROBLEM — O23.41 UTI IN PREGNANCY, ANTEPARTUM, FIRST TRIMESTER: Status: RESOLVED | Noted: 2021-09-12 | Resolved: 2023-03-03

## 2023-03-03 PROBLEM — Z34.83 PRENATAL CARE, SUBSEQUENT PREGNANCY IN THIRD TRIMESTER: Status: RESOLVED | Noted: 2021-09-12 | Resolved: 2023-03-03

## 2023-03-03 ASSESSMENT — ENCOUNTER SYMPTOMS
ABDOMINAL PAIN: 0
COLOR CHANGE: 0
VOMITING: 0
NAUSEA: 1
SHORTNESS OF BREATH: 0
PHOTOPHOBIA: 0

## 2023-03-31 ENCOUNTER — INITIAL PRENATAL (OUTPATIENT)
Dept: OBGYN CLINIC | Age: 28
End: 2023-03-31
Payer: MEDICAID

## 2023-03-31 VITALS
WEIGHT: 120.4 LBS | SYSTOLIC BLOOD PRESSURE: 112 MMHG | HEART RATE: 88 BPM | BODY MASS INDEX: 22.02 KG/M2 | DIASTOLIC BLOOD PRESSURE: 80 MMHG

## 2023-03-31 DIAGNOSIS — Z87.59 HISTORY OF GESTATIONAL HYPERTENSION: ICD-10-CM

## 2023-03-31 DIAGNOSIS — Z34.82 PRENATAL CARE, SUBSEQUENT PREGNANCY IN SECOND TRIMESTER: Primary | ICD-10-CM

## 2023-03-31 LAB
ALBUMIN SERPL-MCNC: 4.3 G/DL (ref 3.4–5)
ALBUMIN/GLOB SERPL: 1.7 {RATIO} (ref 1.1–2.2)
ALP SERPL-CCNC: 52 U/L (ref 40–129)
ALT SERPL-CCNC: 8 U/L (ref 10–40)
ANION GAP SERPL CALCULATED.3IONS-SCNC: 13 MMOL/L (ref 3–16)
AST SERPL-CCNC: 18 U/L (ref 15–37)
BASOPHILS # BLD: 0 K/UL (ref 0–0.2)
BASOPHILS NFR BLD: 0.3 %
BILIRUB SERPL-MCNC: <0.2 MG/DL (ref 0–1)
BUN SERPL-MCNC: 9 MG/DL (ref 7–20)
CALCIUM SERPL-MCNC: 9.8 MG/DL (ref 8.3–10.6)
CHLORIDE SERPL-SCNC: 101 MMOL/L (ref 99–110)
CO2 SERPL-SCNC: 22 MMOL/L (ref 21–32)
CREAT SERPL-MCNC: <0.5 MG/DL (ref 0.6–1.1)
CREAT UR-MCNC: 46.2 MG/DL (ref 28–259)
DEPRECATED RDW RBC AUTO: 14.1 % (ref 12.4–15.4)
EOSINOPHIL # BLD: 0 K/UL (ref 0–0.6)
EOSINOPHIL NFR BLD: 0.2 %
GFR SERPLBLD CREATININE-BSD FMLA CKD-EPI: >60 ML/MIN/{1.73_M2}
GLUCOSE SERPL-MCNC: 79 MG/DL (ref 70–99)
HCT VFR BLD AUTO: 40.5 % (ref 36–48)
HGB BLD-MCNC: 13.2 G/DL (ref 12–16)
LYMPHOCYTES # BLD: 2.1 K/UL (ref 1–5.1)
LYMPHOCYTES NFR BLD: 18.7 %
MCH RBC QN AUTO: 26.2 PG (ref 26–34)
MCHC RBC AUTO-ENTMCNC: 32.5 G/DL (ref 31–36)
MCV RBC AUTO: 80.7 FL (ref 80–100)
MONOCYTES # BLD: 0.5 K/UL (ref 0–1.3)
MONOCYTES NFR BLD: 4.3 %
NEUTROPHILS # BLD: 8.6 K/UL (ref 1.7–7.7)
NEUTROPHILS NFR BLD: 76.5 %
PLATELET # BLD AUTO: 257 K/UL (ref 135–450)
PMV BLD AUTO: 9.3 FL (ref 5–10.5)
POTASSIUM SERPL-SCNC: 3.9 MMOL/L (ref 3.5–5.1)
PROT SERPL-MCNC: 6.8 G/DL (ref 6.4–8.2)
PROT UR-MCNC: 6 MG/DL
PROT/CREAT UR-RTO: 0.1 MG/DL
RBC # BLD AUTO: 5.02 M/UL (ref 4–5.2)
RUBV IGG SERPL IA-ACNC: 51.6 IU/ML
SODIUM SERPL-SCNC: 136 MMOL/L (ref 136–145)
URATE SERPL-MCNC: 3.6 MG/DL (ref 2.6–6)
WBC # BLD AUTO: 11.3 K/UL (ref 4–11)

## 2023-03-31 PROCEDURE — 99212 OFFICE O/P EST SF 10 MIN: CPT | Performed by: OBSTETRICS & GYNECOLOGY

## 2023-03-31 PROCEDURE — 36415 COLL VENOUS BLD VENIPUNCTURE: CPT | Performed by: OBSTETRICS & GYNECOLOGY

## 2023-03-31 RX ORDER — PNV NO.95/FERROUS FUM/FOLIC AC 28MG-0.8MG
1 TABLET ORAL DAILY
Qty: 30 TABLET | Refills: 3 | Status: SHIPPED | OUTPATIENT
Start: 2023-03-31

## 2023-03-31 RX ORDER — ASPIRIN 81 MG/1
81 TABLET ORAL DAILY
Qty: 90 TABLET | Refills: 1 | Status: SHIPPED | OUTPATIENT
Start: 2023-03-31

## 2023-03-31 NOTE — PROGRESS NOTES
Blood draw from R Antecubital x 1 attempt without difficulty. 3 SST, 2 PST, 1 LAV tubes, Mat 21 drawn. Patient tolerated well.  Marcelle Rodríguez LPN

## 2023-03-31 NOTE — PROGRESS NOTES
33 y.o. [id number] at 12w1d EGA Estimated Date of Delivery: 10/12/23 here for Initial Prenatal Visit:     Pt seen and examined. No concerns/complaints. Denies VB, LOF, CTX. Denies FM yet. Denies fevers / chills / chest pain / shortness of breath. Denies HA, changes with vision, RUQ pain, edema. MWQ, not completed but denies any issues. Objective:  /80   Pulse 88   Wt 120 lb 6.4 oz (54.6 kg)   LMP 01/05/2023   BMI 22.02 kg/m²   Gen: AO, NAD  Abd: Soft, NT, gravid    FHT: 144 bpm   Ext: Mild LE edema  OMM: Increased lumbar lordosis    Assessment/Plan:   Diagnosis Orders   1. Prenatal care, subsequent pregnancy in second trimester  HIV Screen    Hepatitis B Surface Antigen    CBC with Auto Differential    Type and Screen    Varicella Zoster Antibody, IgG    Rubella antibody, IgG    Syphilis Antibody Cascading Reflex    Drug Screen Multi Urine With Bup    Hep C AB RLFX HCV PCR-A    Prenatal Vit-Fe Fumarate-FA (PRENATAL VITAMIN) 27-0.8 MG TABS      2. History of gestational hypertension  aspirin EC 81 MG EC tablet    Comprehensive Metabolic Panel    Uric Acid    Lactate Dehydrogenase    Protein / Creatinine Ratio, Urine      3. Patient is a currently breast-feeding mother           Prenatal Care, Subsequent  pregnancy   Reassuring fetal / maternal status today  Aneuploidy / Carrier Screen: [id number] pending     AFP:   PNL: pending   Anatomy:   28 wk: GCT  Hgb Plt   Tdap: at 28 wks    GBS: 35-37 wks   Birth Plan:    Breastfeeding Education:     Hx of GHTN in previous pregnancy   ASA from 12-36 wks   BP normal   Return precautions reviewed   Baseline HELLP labs pending       Follow Up  Return OB precautions reviewed   Return in about 4 weeks (around 4/28/2023) for Return OB visit.     Derral Patient, DO

## 2023-04-01 LAB
ABO + RH BLD: NORMAL
AMPHETAMINES UR QL SCN>1000 NG/ML: NORMAL
BARBITURATES UR QL SCN>200 NG/ML: NORMAL
BENZODIAZ UR QL SCN>200 NG/ML: NORMAL
BLD GP AB SCN SERPL QL: NORMAL
BUPRENORPHINE+NOR UR QL SCN: NORMAL
CANNABINOIDS UR QL SCN>50 NG/ML: NORMAL
COCAINE UR QL SCN: NORMAL
DRUG SCREEN COMMENT UR-IMP: NORMAL
FENTANYL SCREEN, URINE: NORMAL
HBV SURFACE AG SERPL QL IA: NORMAL
HIV 1+2 AB+HIV1 P24 AG SERPL QL IA: NORMAL
HIV 2 AB SERPL QL IA: NORMAL
HIV1 AB SERPL QL IA: NORMAL
HIV1 P24 AG SERPL QL IA: NORMAL
METHADONE UR QL SCN>300 NG/ML: NORMAL
OPIATES UR QL SCN>300 NG/ML: NORMAL
OXYCODONE UR QL SCN: NORMAL
PCP UR QL SCN>25 NG/ML: NORMAL
PH UR STRIP: 6 [PH]
REAGIN+T PALLIDUM IGG+IGM SERPL-IMP: NORMAL
VZV IGG SER QL IA: NORMAL

## 2023-04-02 LAB
HCV AB S/CO SERPL IA: 0.05 IV
HCV AB SERPL QL IA: NEGATIVE

## 2023-04-26 ENCOUNTER — ROUTINE PRENATAL (OUTPATIENT)
Dept: OBGYN CLINIC | Age: 28
End: 2023-04-26
Payer: MEDICAID

## 2023-04-26 VITALS
WEIGHT: 122.4 LBS | SYSTOLIC BLOOD PRESSURE: 110 MMHG | HEART RATE: 84 BPM | DIASTOLIC BLOOD PRESSURE: 64 MMHG | BODY MASS INDEX: 22.39 KG/M2

## 2023-04-26 DIAGNOSIS — Z34.82 PRENATAL CARE, SUBSEQUENT PREGNANCY IN SECOND TRIMESTER: ICD-10-CM

## 2023-04-26 DIAGNOSIS — Z87.59 HISTORY OF GESTATIONAL HYPERTENSION: ICD-10-CM

## 2023-04-26 DIAGNOSIS — Z34.82 PRENATAL CARE, SUBSEQUENT PREGNANCY IN SECOND TRIMESTER: Primary | ICD-10-CM

## 2023-04-26 PROCEDURE — 99212 OFFICE O/P EST SF 10 MIN: CPT | Performed by: OBSTETRICS & GYNECOLOGY

## 2023-04-26 NOTE — PROGRESS NOTES
33 y.o. [id number] at [address] Estimated Date of Delivery: 10/12/23 here for FRANCIS Visit:     Pt seen and examined. No concerns/complaints. States nausea has improved. Reviewed IPV labs, normal.   Denies VB, LOF, CTX. Denies FM yet. Denies fevers / chills / chest pain / shortness of breath. Denies HA, changes with vision, RUQ pain, edema. MWQ Reviewed, 0.    Objective:  /64   Pulse 84   Wt 122 lb 6.4 oz (55.5 kg)   LMP 01/05/2023   BMI 22.39 kg/m²   Gen: AO, NAD  Abd: Soft, NT, gravid   Ext: Mild LE edema  OMM: Increased lumbar lordosis    Assessment/Plan:   Diagnosis Orders   1. Prenatal care, subsequent pregnancy in second trimester        2. History of gestational hypertension        3. Patient is a currently breast-feeding mother           Prenatal Care, Subsequent  pregnancy   Reassuring fetal / maternal status today  Aneuploidy / Carrier Screen: [id number] pending     AFP:  PNL: O+/ab(-), RI, HepBnr, Hep Cnr, HIVnr, RPRnr, Varicella IMM, Hgb 13.2, Plt 257, UDS neg, UCx neg, GCCT neg, Pap NILM 2/28/23    PIH Baseline: Plt 257 / Cr <0.5 / ALT 8 / AST 18 / UA 3.6 / LDH pend / UPC 0.1  Anatomy: At next visit   28 wk: GCT  Hgb Plt   Tdap: at 28 wks    GBS: 35-37 wks   Birth Plan:    Breastfeeding Education:     Hx of GHTN in previous pregnancy   ASA from 12-36 wks   BP normal today / Return precautions reviewed   Baseline HELLP labs pending       Follow Up  Return OB precautions reviewed   Return in about 4 weeks (around 5/24/2023) for Return OB visit, Ultrasound.     Sandy Ralph DO

## 2023-05-02 RX ORDER — PRENATAL VIT/IRON FUM/FOLIC AC 27MG-0.8MG
TABLET ORAL
Qty: 30 TABLET | Refills: 3 | Status: SHIPPED | OUTPATIENT
Start: 2023-05-02

## 2023-05-25 ENCOUNTER — ROUTINE PRENATAL (OUTPATIENT)
Dept: OBGYN CLINIC | Age: 28
End: 2023-05-25
Payer: MEDICAID

## 2023-05-25 VITALS — SYSTOLIC BLOOD PRESSURE: 124 MMHG | WEIGHT: 126.6 LBS | DIASTOLIC BLOOD PRESSURE: 80 MMHG | BODY MASS INDEX: 23.16 KG/M2

## 2023-05-25 DIAGNOSIS — Z87.59 HISTORY OF GESTATIONAL HYPERTENSION: ICD-10-CM

## 2023-05-25 DIAGNOSIS — Z34.82 PRENATAL CARE, SUBSEQUENT PREGNANCY IN SECOND TRIMESTER: Primary | ICD-10-CM

## 2023-05-25 DIAGNOSIS — Z36.89 ENCOUNTER FOR FETAL ANATOMIC SURVEY: ICD-10-CM

## 2023-05-25 PROCEDURE — 99213 OFFICE O/P EST LOW 20 MIN: CPT | Performed by: OBSTETRICS & GYNECOLOGY

## 2023-06-21 ENCOUNTER — ROUTINE PRENATAL (OUTPATIENT)
Dept: OBGYN CLINIC | Age: 28
End: 2023-06-21
Payer: MEDICAID

## 2023-06-21 VITALS
TEMPERATURE: 99.9 F | BODY MASS INDEX: 24.55 KG/M2 | DIASTOLIC BLOOD PRESSURE: 70 MMHG | HEART RATE: 70 BPM | WEIGHT: 134.2 LBS | SYSTOLIC BLOOD PRESSURE: 122 MMHG

## 2023-06-21 DIAGNOSIS — Z34.82 PRENATAL CARE, SUBSEQUENT PREGNANCY IN SECOND TRIMESTER: Primary | ICD-10-CM

## 2023-06-21 DIAGNOSIS — Z87.59 HISTORY OF GESTATIONAL HYPERTENSION: ICD-10-CM

## 2023-06-21 PROCEDURE — 99213 OFFICE O/P EST LOW 20 MIN: CPT | Performed by: OBSTETRICS & GYNECOLOGY

## 2023-06-21 NOTE — PROGRESS NOTES
33 y.o. [id number] at [address] Estimated Date of Delivery: 10/12/23 here for FRANCIS:     Pt seen and examined. She reports occasional pressure when she is standing for prolonged periods of time. Denies VB, LOF, CTX. Endorses (+) FM. Denies fevers / chills / chest pain / shortness of breath. Denies HA, changes with vision, RUQ pain, edema. MWQ reviewed. Objective:  /70   Pulse 70   Temp 99.9 °F (37.7 °C) (Infrared)   Wt 134 lb 3.2 oz (60.9 kg)   LMP 01/05/2023   BMI 24.55 kg/m²   Gen: AO, NAD  Abd: Soft, NT, gravid   Ext: Mild LE edema    Assessment/Plan:   Diagnosis Orders   1. Prenatal care, subsequent pregnancy in second trimester        2. History of gestational hypertension            Diagnosis Orders   1. Prenatal care, subsequent pregnancy in second trimester     Reassuring fetal / maternal status today  Aneuploidy / Carrier Screen: [id number] pending     AFP:  PNL: O+/ab(-), RI, HepBnr, Hep Cnr, HIVnr, RPRnr, Varicella IMM, Hgb 13.2, Plt 257, UDS neg, UCx neg, GCCT neg, Pap NILM 2/28/23    PIH Baseline: Plt 257 / Cr <0.5 / ALT 8 / AST 18 / UA 3.6 / LDH pend / UPC 0.1  Anatomy: 5/25/23 normal female anatomy, Ant placenta no previa. CL 4.93 cm without funneling. 28 wk: GCT  Hgb Plt   Tdap: at 28 wks    GBS: 35-37 wks   Birth Plan:    Breastfeeding Education:   Encouraged belly band    2. History of gestational hypertension     -Continue baby asa      - reassuring maternal / fetal status     Follow Up  Return OB precautions reviewed   Return in about 4 weeks (around 7/19/2023). Approximately 5 minutes spent in room counseling patient on condition and coordination of care with over 50% in direct face to face counseling.      Unknown Binder, DO

## 2023-07-18 SDOH — ECONOMIC STABILITY: TRANSPORTATION INSECURITY
IN THE PAST 12 MONTHS, HAS LACK OF TRANSPORTATION KEPT YOU FROM MEETINGS, WORK, OR FROM GETTING THINGS NEEDED FOR DAILY LIVING?: NO

## 2023-07-18 SDOH — ECONOMIC STABILITY: FOOD INSECURITY: WITHIN THE PAST 12 MONTHS, THE FOOD YOU BOUGHT JUST DIDN'T LAST AND YOU DIDN'T HAVE MONEY TO GET MORE.: NEVER TRUE

## 2023-07-18 SDOH — ECONOMIC STABILITY: HOUSING INSECURITY
IN THE LAST 12 MONTHS, WAS THERE A TIME WHEN YOU DID NOT HAVE A STEADY PLACE TO SLEEP OR SLEPT IN A SHELTER (INCLUDING NOW)?: NO

## 2023-07-18 SDOH — ECONOMIC STABILITY: INCOME INSECURITY: HOW HARD IS IT FOR YOU TO PAY FOR THE VERY BASICS LIKE FOOD, HOUSING, MEDICAL CARE, AND HEATING?: SOMEWHAT HARD

## 2023-07-18 SDOH — ECONOMIC STABILITY: FOOD INSECURITY: WITHIN THE PAST 12 MONTHS, YOU WORRIED THAT YOUR FOOD WOULD RUN OUT BEFORE YOU GOT MONEY TO BUY MORE.: NEVER TRUE

## 2023-07-19 ENCOUNTER — ROUTINE PRENATAL (OUTPATIENT)
Dept: OBGYN CLINIC | Age: 28
End: 2023-07-19

## 2023-07-19 VITALS
BODY MASS INDEX: 25.53 KG/M2 | TEMPERATURE: 97.8 F | OXYGEN SATURATION: 99 % | DIASTOLIC BLOOD PRESSURE: 84 MMHG | WEIGHT: 139.6 LBS | HEART RATE: 103 BPM | SYSTOLIC BLOOD PRESSURE: 122 MMHG

## 2023-07-19 DIAGNOSIS — Z23 NEED FOR TDAP VACCINATION: ICD-10-CM

## 2023-07-19 DIAGNOSIS — Z36.9 ANTENATAL SCREENING ENCOUNTER: ICD-10-CM

## 2023-07-19 DIAGNOSIS — Z87.59 HISTORY OF GESTATIONAL HYPERTENSION: ICD-10-CM

## 2023-07-19 DIAGNOSIS — O26.893 HEARTBURN DURING PREGNANCY IN THIRD TRIMESTER: ICD-10-CM

## 2023-07-19 DIAGNOSIS — Z34.83 PRENATAL CARE, SUBSEQUENT PREGNANCY IN THIRD TRIMESTER: Primary | ICD-10-CM

## 2023-07-19 DIAGNOSIS — R12 HEARTBURN DURING PREGNANCY IN THIRD TRIMESTER: ICD-10-CM

## 2023-07-19 RX ORDER — FAMOTIDINE 20 MG/1
20 TABLET, FILM COATED ORAL 2 TIMES DAILY
Qty: 60 TABLET | Refills: 3 | Status: SHIPPED | OUTPATIENT
Start: 2023-07-19

## 2023-07-19 NOTE — PROGRESS NOTES
.eastobgynglucose    Patient started drink at 1:49 and finished at 1:52   . Patient tolerated drink well.   1 green tube drawn at 2:52. # 50 grams of Glucola. No complain of nausea and vomiting at this time, will continue to monitor. 3:10 PM Given Tdap (Adacel) vaccine 0.5mL IM  Site:Left deltoid. Lot # [id number]  Expiration Date:  10/11/25  [address] [place] [address] # 38589-330-78 . Patient tolerated well. No reaction noted after 20 minutes. VIS sheet provided.   Administered by: Otilia Hernandez LPN

## 2023-07-20 LAB
BASOPHILS # BLD: 0 K/UL (ref 0–0.2)
BASOPHILS NFR BLD: 0.3 %
DEPRECATED RDW RBC AUTO: 14.8 % (ref 12.4–15.4)
EOSINOPHIL # BLD: 0 K/UL (ref 0–0.6)
EOSINOPHIL NFR BLD: 0.4 %
GLUCOSE 1H P 50 G GLC PO SERPL-MCNC: 135 MG/DL
HCT VFR BLD AUTO: 34.2 % (ref 36–48)
HGB BLD-MCNC: 11.4 G/DL (ref 12–16)
LYMPHOCYTES # BLD: 1.7 K/UL (ref 1–5.1)
LYMPHOCYTES NFR BLD: 16.5 %
MCH RBC QN AUTO: 27.5 PG (ref 26–34)
MCHC RBC AUTO-ENTMCNC: 33.2 G/DL (ref 31–36)
MCV RBC AUTO: 82.9 FL (ref 80–100)
MONOCYTES # BLD: 0.4 K/UL (ref 0–1.3)
MONOCYTES NFR BLD: 4.2 %
NEUTROPHILS # BLD: 8 K/UL (ref 1.7–7.7)
NEUTROPHILS NFR BLD: 78.6 %
PLATELET # BLD AUTO: 160 K/UL (ref 135–450)
PMV BLD AUTO: 9.2 FL (ref 5–10.5)
RBC # BLD AUTO: 4.13 M/UL (ref 4–5.2)
WBC # BLD AUTO: 10.2 K/UL (ref 4–11)

## 2023-07-28 DIAGNOSIS — Z34.82 PRENATAL CARE, SUBSEQUENT PREGNANCY IN SECOND TRIMESTER: ICD-10-CM

## 2023-07-28 RX ORDER — LORATADINE 10 MG
TABLET ORAL
Qty: 90 TABLET | Refills: 1 | Status: SHIPPED | OUTPATIENT
Start: 2023-07-28

## 2023-08-02 ENCOUNTER — ROUTINE PRENATAL (OUTPATIENT)
Dept: OBGYN CLINIC | Age: 28
End: 2023-08-02
Payer: COMMERCIAL

## 2023-08-02 VITALS
BODY MASS INDEX: 25.68 KG/M2 | WEIGHT: 140.4 LBS | HEART RATE: 99 BPM | SYSTOLIC BLOOD PRESSURE: 118 MMHG | DIASTOLIC BLOOD PRESSURE: 80 MMHG

## 2023-08-02 DIAGNOSIS — Z87.59 HISTORY OF GESTATIONAL HYPERTENSION: ICD-10-CM

## 2023-08-02 DIAGNOSIS — Z34.82 PRENATAL CARE, SUBSEQUENT PREGNANCY IN SECOND TRIMESTER: Primary | ICD-10-CM

## 2023-08-02 PROCEDURE — 99213 OFFICE O/P EST LOW 20 MIN: CPT | Performed by: OBSTETRICS & GYNECOLOGY

## 2023-08-02 PROCEDURE — G8419 CALC BMI OUT NRM PARAM NOF/U: HCPCS | Performed by: OBSTETRICS & GYNECOLOGY

## 2023-08-02 PROCEDURE — 1036F TOBACCO NON-USER: CPT | Performed by: OBSTETRICS & GYNECOLOGY

## 2023-08-02 PROCEDURE — G8427 DOCREV CUR MEDS BY ELIG CLIN: HCPCS | Performed by: OBSTETRICS & GYNECOLOGY

## 2023-08-15 ENCOUNTER — TELEPHONE (OUTPATIENT)
Dept: OBGYN CLINIC | Age: 28
End: 2023-08-15

## 2023-08-15 NOTE — TELEPHONE ENCOUNTER
This patient left without being seen. She failed her repeat one hour. She needs to come in for the three hour or testing supplies need to be sent in to her pharmacy.

## 2023-08-16 NOTE — TELEPHONE ENCOUNTER
LM for pt to call the office to see if she is willing to schedule the 3 hr GTT or if she prefers we send in testing supplies.

## 2023-08-25 ENCOUNTER — ROUTINE PRENATAL (OUTPATIENT)
Dept: OBGYN CLINIC | Age: 28
End: 2023-08-25
Payer: COMMERCIAL

## 2023-08-25 VITALS
BODY MASS INDEX: 26.45 KG/M2 | SYSTOLIC BLOOD PRESSURE: 124 MMHG | DIASTOLIC BLOOD PRESSURE: 86 MMHG | WEIGHT: 144.6 LBS | HEART RATE: 90 BPM

## 2023-08-25 DIAGNOSIS — Z34.83 PRENATAL CARE, SUBSEQUENT PREGNANCY IN THIRD TRIMESTER: Primary | ICD-10-CM

## 2023-08-25 DIAGNOSIS — O99.810 ABNORMAL GLUCOSE AFFECTING PREGNANCY: ICD-10-CM

## 2023-08-25 DIAGNOSIS — O26.893 HEARTBURN DURING PREGNANCY IN THIRD TRIMESTER: ICD-10-CM

## 2023-08-25 DIAGNOSIS — R12 HEARTBURN DURING PREGNANCY IN THIRD TRIMESTER: ICD-10-CM

## 2023-08-25 DIAGNOSIS — Z87.59 HISTORY OF GESTATIONAL HYPERTENSION: ICD-10-CM

## 2023-08-25 PROCEDURE — G8427 DOCREV CUR MEDS BY ELIG CLIN: HCPCS | Performed by: OBSTETRICS & GYNECOLOGY

## 2023-08-25 PROCEDURE — 99213 OFFICE O/P EST LOW 20 MIN: CPT | Performed by: OBSTETRICS & GYNECOLOGY

## 2023-08-25 PROCEDURE — G8419 CALC BMI OUT NRM PARAM NOF/U: HCPCS | Performed by: OBSTETRICS & GYNECOLOGY

## 2023-08-25 PROCEDURE — 1036F TOBACCO NON-USER: CPT | Performed by: OBSTETRICS & GYNECOLOGY

## 2023-08-25 RX ORDER — BLOOD-GLUCOSE METER
1 KIT MISCELLANEOUS DAILY
Qty: 1 KIT | Refills: 0 | OUTPATIENT
Start: 2023-08-25

## 2023-08-25 RX ORDER — LANCETS 30 GAUGE
1 EACH MISCELLANEOUS 4 TIMES DAILY
Qty: 200 EACH | Refills: 0 | OUTPATIENT
Start: 2023-08-25

## 2023-08-25 RX ORDER — GLUCOSAMINE HCL/CHONDROITIN SU 500-400 MG
CAPSULE ORAL
Qty: 200 STRIP | Refills: 5 | OUTPATIENT
Start: 2023-08-25

## 2023-08-25 RX ORDER — ACETAMINOPHEN 160 MG
TABLET,DISINTEGRATING ORAL
Qty: 200 EACH | Refills: 5 | OUTPATIENT
Start: 2023-08-25

## 2023-08-25 ASSESSMENT — PATIENT HEALTH QUESTIONNAIRE - PHQ9
1. LITTLE INTEREST OR PLEASURE IN DOING THINGS: 0
SUM OF ALL RESPONSES TO PHQ QUESTIONS 1-9: 0
2. FEELING DOWN, DEPRESSED OR HOPELESS: 0
SUM OF ALL RESPONSES TO PHQ9 QUESTIONS 1 & 2: 0

## 2023-08-31 ENCOUNTER — ROUTINE PRENATAL (OUTPATIENT)
Dept: OBGYN CLINIC | Age: 28
End: 2023-08-31
Payer: COMMERCIAL

## 2023-08-31 VITALS
HEART RATE: 89 BPM | BODY MASS INDEX: 26.67 KG/M2 | DIASTOLIC BLOOD PRESSURE: 78 MMHG | WEIGHT: 145.8 LBS | SYSTOLIC BLOOD PRESSURE: 120 MMHG

## 2023-08-31 DIAGNOSIS — O26.893 HEARTBURN DURING PREGNANCY IN THIRD TRIMESTER: ICD-10-CM

## 2023-08-31 DIAGNOSIS — O99.810 ABNORMAL GLUCOSE AFFECTING PREGNANCY: ICD-10-CM

## 2023-08-31 DIAGNOSIS — Z34.83 PRENATAL CARE, SUBSEQUENT PREGNANCY IN THIRD TRIMESTER: Primary | ICD-10-CM

## 2023-08-31 DIAGNOSIS — Z36.89 ENCOUNTER FOR ULTRASOUND TO CHECK FETAL GROWTH: ICD-10-CM

## 2023-08-31 DIAGNOSIS — Z87.59 HISTORY OF GESTATIONAL HYPERTENSION: ICD-10-CM

## 2023-08-31 DIAGNOSIS — R12 HEARTBURN DURING PREGNANCY IN THIRD TRIMESTER: ICD-10-CM

## 2023-08-31 PROCEDURE — G8427 DOCREV CUR MEDS BY ELIG CLIN: HCPCS | Performed by: OBSTETRICS & GYNECOLOGY

## 2023-08-31 PROCEDURE — G8419 CALC BMI OUT NRM PARAM NOF/U: HCPCS | Performed by: OBSTETRICS & GYNECOLOGY

## 2023-08-31 PROCEDURE — 1036F TOBACCO NON-USER: CPT | Performed by: OBSTETRICS & GYNECOLOGY

## 2023-08-31 PROCEDURE — 99213 OFFICE O/P EST LOW 20 MIN: CPT | Performed by: OBSTETRICS & GYNECOLOGY

## 2023-08-31 NOTE — PROGRESS NOTES
Maternal emotional well being screening form completed and reviewed with patient. Current score is 1. Patient given referral to [address] [place] [address] ([phone]):  No

## 2023-09-06 ENCOUNTER — ROUTINE PRENATAL (OUTPATIENT)
Dept: OBGYN CLINIC | Age: 28
End: 2023-09-06
Payer: COMMERCIAL

## 2023-09-06 VITALS
WEIGHT: 147.2 LBS | BODY MASS INDEX: 26.92 KG/M2 | SYSTOLIC BLOOD PRESSURE: 120 MMHG | DIASTOLIC BLOOD PRESSURE: 70 MMHG | HEART RATE: 105 BPM

## 2023-09-06 DIAGNOSIS — O26.893 HEARTBURN DURING PREGNANCY IN THIRD TRIMESTER: ICD-10-CM

## 2023-09-06 DIAGNOSIS — R12 HEARTBURN DURING PREGNANCY IN THIRD TRIMESTER: ICD-10-CM

## 2023-09-06 DIAGNOSIS — Z87.59 HISTORY OF GESTATIONAL HYPERTENSION: ICD-10-CM

## 2023-09-06 DIAGNOSIS — O99.810 ABNORMAL GLUCOSE AFFECTING PREGNANCY: ICD-10-CM

## 2023-09-06 DIAGNOSIS — Z34.83 PRENATAL CARE, SUBSEQUENT PREGNANCY IN THIRD TRIMESTER: Primary | ICD-10-CM

## 2023-09-06 PROCEDURE — G8427 DOCREV CUR MEDS BY ELIG CLIN: HCPCS | Performed by: OBSTETRICS & GYNECOLOGY

## 2023-09-06 PROCEDURE — G8419 CALC BMI OUT NRM PARAM NOF/U: HCPCS | Performed by: OBSTETRICS & GYNECOLOGY

## 2023-09-06 PROCEDURE — 99213 OFFICE O/P EST LOW 20 MIN: CPT | Performed by: OBSTETRICS & GYNECOLOGY

## 2023-09-06 PROCEDURE — 1036F TOBACCO NON-USER: CPT | Performed by: OBSTETRICS & GYNECOLOGY

## 2023-09-11 ENCOUNTER — ROUTINE PRENATAL (OUTPATIENT)
Dept: OBGYN CLINIC | Age: 28
End: 2023-09-11
Payer: COMMERCIAL

## 2023-09-11 VITALS
WEIGHT: 146.8 LBS | HEART RATE: 112 BPM | SYSTOLIC BLOOD PRESSURE: 126 MMHG | BODY MASS INDEX: 26.85 KG/M2 | DIASTOLIC BLOOD PRESSURE: 74 MMHG

## 2023-09-11 DIAGNOSIS — R09.81 SINUS CONGESTION: ICD-10-CM

## 2023-09-11 DIAGNOSIS — O99.810 ABNORMAL GLUCOSE AFFECTING PREGNANCY: ICD-10-CM

## 2023-09-11 DIAGNOSIS — Z87.59 HISTORY OF GESTATIONAL HYPERTENSION: ICD-10-CM

## 2023-09-11 DIAGNOSIS — O09.93 HIGH-RISK PREGNANCY IN THIRD TRIMESTER: ICD-10-CM

## 2023-09-11 DIAGNOSIS — R12 HEARTBURN DURING PREGNANCY IN THIRD TRIMESTER: ICD-10-CM

## 2023-09-11 DIAGNOSIS — Z36.89 ENCOUNTER FOR ULTRASOUND TO CHECK FETAL GROWTH: ICD-10-CM

## 2023-09-11 DIAGNOSIS — Z34.83 PRENATAL CARE, SUBSEQUENT PREGNANCY IN THIRD TRIMESTER: Primary | ICD-10-CM

## 2023-09-11 DIAGNOSIS — O26.893 HEARTBURN DURING PREGNANCY IN THIRD TRIMESTER: ICD-10-CM

## 2023-09-11 PROCEDURE — G8427 DOCREV CUR MEDS BY ELIG CLIN: HCPCS | Performed by: OBSTETRICS & GYNECOLOGY

## 2023-09-11 PROCEDURE — G8419 CALC BMI OUT NRM PARAM NOF/U: HCPCS | Performed by: OBSTETRICS & GYNECOLOGY

## 2023-09-11 PROCEDURE — 99213 OFFICE O/P EST LOW 20 MIN: CPT | Performed by: OBSTETRICS & GYNECOLOGY

## 2023-09-11 PROCEDURE — 1036F TOBACCO NON-USER: CPT | Performed by: OBSTETRICS & GYNECOLOGY

## 2023-09-21 ENCOUNTER — ROUTINE PRENATAL (OUTPATIENT)
Dept: OBGYN CLINIC | Age: 28
End: 2023-09-21
Payer: COMMERCIAL

## 2023-09-21 VITALS
WEIGHT: 147 LBS | SYSTOLIC BLOOD PRESSURE: 120 MMHG | HEART RATE: 94 BPM | DIASTOLIC BLOOD PRESSURE: 80 MMHG | BODY MASS INDEX: 26.89 KG/M2

## 2023-09-21 DIAGNOSIS — O99.810 ABNORMAL GLUCOSE AFFECTING PREGNANCY: ICD-10-CM

## 2023-09-21 DIAGNOSIS — O09.93 HIGH-RISK PREGNANCY IN THIRD TRIMESTER: ICD-10-CM

## 2023-09-21 DIAGNOSIS — Z34.83 PRENATAL CARE, SUBSEQUENT PREGNANCY IN THIRD TRIMESTER: Primary | ICD-10-CM

## 2023-09-21 PROCEDURE — 99213 OFFICE O/P EST LOW 20 MIN: CPT | Performed by: OBSTETRICS & GYNECOLOGY

## 2023-09-21 PROCEDURE — 1036F TOBACCO NON-USER: CPT | Performed by: OBSTETRICS & GYNECOLOGY

## 2023-09-21 PROCEDURE — G8427 DOCREV CUR MEDS BY ELIG CLIN: HCPCS | Performed by: OBSTETRICS & GYNECOLOGY

## 2023-09-21 PROCEDURE — G8419 CALC BMI OUT NRM PARAM NOF/U: HCPCS | Performed by: OBSTETRICS & GYNECOLOGY

## 2023-09-24 LAB — GP B STREP SPEC QL CULT: NORMAL

## 2023-09-28 ENCOUNTER — ROUTINE PRENATAL (OUTPATIENT)
Dept: OBGYN CLINIC | Age: 28
End: 2023-09-28
Payer: COMMERCIAL

## 2023-09-28 VITALS
DIASTOLIC BLOOD PRESSURE: 86 MMHG | TEMPERATURE: 98.1 F | BODY MASS INDEX: 27.25 KG/M2 | SYSTOLIC BLOOD PRESSURE: 126 MMHG | WEIGHT: 149 LBS | HEART RATE: 98 BPM

## 2023-09-28 DIAGNOSIS — R12 HEARTBURN DURING PREGNANCY IN THIRD TRIMESTER: ICD-10-CM

## 2023-09-28 DIAGNOSIS — O99.810 ABNORMAL GLUCOSE AFFECTING PREGNANCY: ICD-10-CM

## 2023-09-28 DIAGNOSIS — Z36.89 ENCOUNTER FOR ULTRASOUND TO CHECK FETAL GROWTH: ICD-10-CM

## 2023-09-28 DIAGNOSIS — Z87.59 HISTORY OF GESTATIONAL HYPERTENSION: ICD-10-CM

## 2023-09-28 DIAGNOSIS — O09.93 HIGH-RISK PREGNANCY IN THIRD TRIMESTER: ICD-10-CM

## 2023-09-28 DIAGNOSIS — O26.893 HEARTBURN DURING PREGNANCY IN THIRD TRIMESTER: ICD-10-CM

## 2023-09-28 DIAGNOSIS — Z34.83 PRENATAL CARE, SUBSEQUENT PREGNANCY IN THIRD TRIMESTER: Primary | ICD-10-CM

## 2023-09-28 PROCEDURE — 99213 OFFICE O/P EST LOW 20 MIN: CPT | Performed by: OBSTETRICS & GYNECOLOGY

## 2023-09-28 PROCEDURE — G8427 DOCREV CUR MEDS BY ELIG CLIN: HCPCS | Performed by: OBSTETRICS & GYNECOLOGY

## 2023-09-28 PROCEDURE — 1036F TOBACCO NON-USER: CPT | Performed by: OBSTETRICS & GYNECOLOGY

## 2023-09-28 PROCEDURE — G8419 CALC BMI OUT NRM PARAM NOF/U: HCPCS | Performed by: OBSTETRICS & GYNECOLOGY

## 2023-10-07 ENCOUNTER — ANESTHESIA (OUTPATIENT)
Dept: LABOR AND DELIVERY | Age: 28
DRG: 560 | End: 2023-10-07
Payer: COMMERCIAL

## 2023-10-07 ENCOUNTER — HOSPITAL ENCOUNTER (INPATIENT)
Age: 28
LOS: 2 days | Discharge: HOME | DRG: 560 | End: 2023-10-09
Attending: OBSTETRICS & GYNECOLOGY | Admitting: OBSTETRICS & GYNECOLOGY
Payer: COMMERCIAL

## 2023-10-07 ENCOUNTER — ANESTHESIA EVENT (OUTPATIENT)
Dept: LABOR AND DELIVERY | Age: 28
DRG: 560 | End: 2023-10-07
Payer: COMMERCIAL

## 2023-10-07 ENCOUNTER — APPOINTMENT (OUTPATIENT)
Dept: LABOR AND DELIVERY | Age: 28
DRG: 560 | End: 2023-10-07
Payer: COMMERCIAL

## 2023-10-07 PROBLEM — Z34.90 ENCOUNTER FOR ELECTIVE INDUCTION OF LABOR: Status: ACTIVE | Noted: 2023-10-07

## 2023-10-07 PROBLEM — Z34.90 ENCOUNTER FOR INDUCTION OF LABOR: Status: ACTIVE | Noted: 2023-10-07

## 2023-10-07 LAB
ABO + RH BLD: NORMAL
AMPHETAMINES UR QL SCN>1000 NG/ML: NORMAL
BARBITURATES UR QL SCN>200 NG/ML: NORMAL
BASOPHILS # BLD: 0 K/UL (ref 0–0.2)
BASOPHILS NFR BLD: 0.3 %
BENZODIAZ UR QL SCN>200 NG/ML: NORMAL
BLD GP AB SCN SERPL QL: NORMAL
BUPRENORPHINE+NOR UR QL SCN: NORMAL
CANNABINOIDS UR QL SCN>50 NG/ML: NORMAL
COCAINE UR QL SCN: NORMAL
DEPRECATED RDW RBC AUTO: 14.5 % (ref 12.4–15.4)
DRUG SCREEN COMMENT UR-IMP: NORMAL
EOSINOPHIL # BLD: 0.1 K/UL (ref 0–0.6)
EOSINOPHIL NFR BLD: 0.4 %
FENTANYL SCREEN, URINE: NORMAL
HCT VFR BLD AUTO: 36.9 % (ref 36–48)
HGB BLD-MCNC: 12 G/DL (ref 12–16)
LYMPHOCYTES # BLD: 3.1 K/UL (ref 1–5.1)
LYMPHOCYTES NFR BLD: 24 %
MCH RBC QN AUTO: 26.3 PG (ref 26–34)
MCHC RBC AUTO-ENTMCNC: 32.5 G/DL (ref 31–36)
MCV RBC AUTO: 80.9 FL (ref 80–100)
METHADONE UR QL SCN>300 NG/ML: NORMAL
MONOCYTES # BLD: 0.5 K/UL (ref 0–1.3)
MONOCYTES NFR BLD: 4.3 %
NEUTROPHILS # BLD: 9.1 K/UL (ref 1.7–7.7)
NEUTROPHILS NFR BLD: 71 %
OPIATES UR QL SCN>300 NG/ML: NORMAL
OXYCODONE UR QL SCN: NORMAL
PCP UR QL SCN>25 NG/ML: NORMAL
PH UR STRIP: 6 [PH]
PLATELET # BLD AUTO: 153 K/UL (ref 135–450)
PMV BLD AUTO: 9.5 FL (ref 5–10.5)
RBC # BLD AUTO: 4.55 M/UL (ref 4–5.2)
WBC # BLD AUTO: 12.8 K/UL (ref 4–11)

## 2023-10-07 PROCEDURE — 2500000003 HC RX 250 WO HCPCS: Performed by: REGISTERED NURSE

## 2023-10-07 PROCEDURE — 0UBGXZZ EXCISION OF VAGINA, EXTERNAL APPROACH: ICD-10-PCS | Performed by: OBSTETRICS & GYNECOLOGY

## 2023-10-07 PROCEDURE — 2580000003 HC RX 258: Performed by: OBSTETRICS & GYNECOLOGY

## 2023-10-07 PROCEDURE — 51701 INSERT BLADDER CATHETER: CPT

## 2023-10-07 PROCEDURE — 3700000025 EPIDURAL BLOCK: Performed by: ANESTHESIOLOGY

## 2023-10-07 PROCEDURE — 7200000001 HC VAGINAL DELIVERY

## 2023-10-07 PROCEDURE — 86780 TREPONEMA PALLIDUM: CPT

## 2023-10-07 PROCEDURE — 1220000000 HC SEMI PRIVATE OB R&B

## 2023-10-07 PROCEDURE — 86900 BLOOD TYPING SEROLOGIC ABO: CPT

## 2023-10-07 PROCEDURE — 6360000002 HC RX W HCPCS

## 2023-10-07 PROCEDURE — 86850 RBC ANTIBODY SCREEN: CPT

## 2023-10-07 PROCEDURE — 86901 BLOOD TYPING SEROLOGIC RH(D): CPT

## 2023-10-07 PROCEDURE — 10907ZC DRAINAGE OF AMNIOTIC FLUID, THERAPEUTIC FROM PRODUCTS OF CONCEPTION, VIA NATURAL OR ARTIFICIAL OPENING: ICD-10-PCS | Performed by: OBSTETRICS & GYNECOLOGY

## 2023-10-07 PROCEDURE — 3E033VJ INTRODUCTION OF OTHER HORMONE INTO PERIPHERAL VEIN, PERCUTANEOUS APPROACH: ICD-10-PCS | Performed by: OBSTETRICS & GYNECOLOGY

## 2023-10-07 PROCEDURE — 80307 DRUG TEST PRSMV CHEM ANLYZR: CPT

## 2023-10-07 PROCEDURE — 6360000002 HC RX W HCPCS: Performed by: REGISTERED NURSE

## 2023-10-07 PROCEDURE — 6360000002 HC RX W HCPCS: Performed by: OBSTETRICS & GYNECOLOGY

## 2023-10-07 PROCEDURE — 85025 COMPLETE CBC W/AUTO DIFF WBC: CPT

## 2023-10-07 RX ORDER — ONDANSETRON 2 MG/ML
4 INJECTION INTRAMUSCULAR; INTRAVENOUS EVERY 6 HOURS PRN
Status: DISCONTINUED | OUTPATIENT
Start: 2023-10-07 | End: 2023-10-08

## 2023-10-07 RX ORDER — SODIUM CHLORIDE, SODIUM LACTATE, POTASSIUM CHLORIDE, AND CALCIUM CHLORIDE .6; .31; .03; .02 G/100ML; G/100ML; G/100ML; G/100ML
500 INJECTION, SOLUTION INTRAVENOUS PRN
Status: DISCONTINUED | OUTPATIENT
Start: 2023-10-07 | End: 2023-10-08

## 2023-10-07 RX ORDER — SODIUM CHLORIDE 0.9 % (FLUSH) 0.9 %
5-40 SYRINGE (ML) INJECTION PRN
Status: DISCONTINUED | OUTPATIENT
Start: 2023-10-07 | End: 2023-10-08

## 2023-10-07 RX ORDER — BUPIVACAINE HYDROCHLORIDE 5 MG/ML
INJECTION, SOLUTION EPIDURAL; INTRACAUDAL PRN
Status: DISCONTINUED | OUTPATIENT
Start: 2023-10-07 | End: 2023-10-08 | Stop reason: SDUPTHER

## 2023-10-07 RX ORDER — SODIUM CHLORIDE, SODIUM LACTATE, POTASSIUM CHLORIDE, AND CALCIUM CHLORIDE .6; .31; .03; .02 G/100ML; G/100ML; G/100ML; G/100ML
1000 INJECTION, SOLUTION INTRAVENOUS PRN
Status: DISCONTINUED | OUTPATIENT
Start: 2023-10-07 | End: 2023-10-08

## 2023-10-07 RX ORDER — LIDOCAINE HYDROCHLORIDE AND EPINEPHRINE BITARTRATE 20; .01 MG/ML; MG/ML
INJECTION, SOLUTION SUBCUTANEOUS PRN
Status: DISCONTINUED | OUTPATIENT
Start: 2023-10-07 | End: 2023-10-08 | Stop reason: SDUPTHER

## 2023-10-07 RX ORDER — SODIUM CHLORIDE, SODIUM LACTATE, POTASSIUM CHLORIDE, CALCIUM CHLORIDE 600; 310; 30; 20 MG/100ML; MG/100ML; MG/100ML; MG/100ML
INJECTION, SOLUTION INTRAVENOUS CONTINUOUS
Status: DISCONTINUED | OUTPATIENT
Start: 2023-10-07 | End: 2023-10-08

## 2023-10-07 RX ORDER — DOCUSATE SODIUM 100 MG/1
100 CAPSULE, LIQUID FILLED ORAL 2 TIMES DAILY
Status: DISCONTINUED | OUTPATIENT
Start: 2023-10-07 | End: 2023-10-08

## 2023-10-07 RX ORDER — SODIUM CHLORIDE 9 MG/ML
25 INJECTION, SOLUTION INTRAVENOUS PRN
Status: DISCONTINUED | OUTPATIENT
Start: 2023-10-07 | End: 2023-10-08

## 2023-10-07 RX ORDER — ACETAMINOPHEN 325 MG/1
650 TABLET ORAL EVERY 4 HOURS PRN
Status: DISCONTINUED | OUTPATIENT
Start: 2023-10-07 | End: 2023-10-08

## 2023-10-07 RX ORDER — SODIUM CHLORIDE 0.9 % (FLUSH) 0.9 %
5-40 SYRINGE (ML) INJECTION EVERY 12 HOURS SCHEDULED
Status: DISCONTINUED | OUTPATIENT
Start: 2023-10-07 | End: 2023-10-08

## 2023-10-07 RX ADMIN — BUPIVACAINE HYDROCHLORIDE 5 ML: 5 INJECTION, SOLUTION EPIDURAL; INTRACAUDAL; PERINEURAL at 21:44

## 2023-10-07 RX ADMIN — BUPIVACAINE HYDROCHLORIDE 5 ML: 5 INJECTION, SOLUTION EPIDURAL; INTRACAUDAL; PERINEURAL at 20:51

## 2023-10-07 RX ADMIN — Medication 1 MILLI-UNITS/MIN: at 14:45

## 2023-10-07 RX ADMIN — LIDOCAINE HYDROCHLORIDE AND EPINEPHRINE 5 ML: 20; 10 INJECTION, SOLUTION INFILTRATION; PERINEURAL at 20:47

## 2023-10-07 RX ADMIN — LIDOCAINE HYDROCHLORIDE 3 ML: 10 INJECTION, SOLUTION INFILTRATION; PERINEURAL at 20:44

## 2023-10-07 RX ADMIN — SODIUM CHLORIDE, POTASSIUM CHLORIDE, SODIUM LACTATE AND CALCIUM CHLORIDE: 600; 310; 30; 20 INJECTION, SOLUTION INTRAVENOUS at 14:40

## 2023-10-07 RX ADMIN — BUPIVACAINE HYDROCHLORIDE 5 ML: 5 INJECTION, SOLUTION EPIDURAL; INTRACAUDAL; PERINEURAL at 20:54

## 2023-10-07 RX ADMIN — Medication 15 ML/HR: at 20:56

## 2023-10-07 RX ADMIN — Medication 909.1 MILLI-UNITS/MIN: at 22:13

## 2023-10-08 LAB
ABO + RH BLD: NORMAL
FETAL SCREEN: NORMAL
HCT VFR BLD AUTO: 32.3 % (ref 36–48)
HGB BLD-MCNC: 10.4 G/DL (ref 12–16)
REAGIN+T PALLIDUM IGG+IGM SERPL-IMP: NORMAL

## 2023-10-08 PROCEDURE — 85014 HEMATOCRIT: CPT

## 2023-10-08 PROCEDURE — 85018 HEMOGLOBIN: CPT

## 2023-10-08 PROCEDURE — 36415 COLL VENOUS BLD VENIPUNCTURE: CPT

## 2023-10-08 PROCEDURE — 6370000000 HC RX 637 (ALT 250 FOR IP): Performed by: OBSTETRICS & GYNECOLOGY

## 2023-10-08 PROCEDURE — 86901 BLOOD TYPING SEROLOGIC RH(D): CPT

## 2023-10-08 PROCEDURE — 86900 BLOOD TYPING SEROLOGIC ABO: CPT

## 2023-10-08 PROCEDURE — 1220000000 HC SEMI PRIVATE OB R&B

## 2023-10-08 PROCEDURE — 2580000003 HC RX 258: Performed by: OBSTETRICS & GYNECOLOGY

## 2023-10-08 RX ORDER — ACETAMINOPHEN 500 MG
1000 TABLET ORAL EVERY 8 HOURS SCHEDULED
Status: DISCONTINUED | OUTPATIENT
Start: 2023-10-08 | End: 2023-10-09 | Stop reason: HOSPADM

## 2023-10-08 RX ORDER — ONDANSETRON 8 MG/1
8 TABLET, ORALLY DISINTEGRATING ORAL EVERY 8 HOURS PRN
Status: DISCONTINUED | OUTPATIENT
Start: 2023-10-08 | End: 2023-10-09 | Stop reason: HOSPADM

## 2023-10-08 RX ORDER — SODIUM CHLORIDE 0.9 % (FLUSH) 0.9 %
5-40 SYRINGE (ML) INJECTION EVERY 12 HOURS SCHEDULED
Status: DISCONTINUED | OUTPATIENT
Start: 2023-10-08 | End: 2023-10-09 | Stop reason: HOSPADM

## 2023-10-08 RX ORDER — DOCUSATE SODIUM 100 MG/1
100 CAPSULE, LIQUID FILLED ORAL 2 TIMES DAILY
Status: DISCONTINUED | OUTPATIENT
Start: 2023-10-08 | End: 2023-10-09 | Stop reason: HOSPADM

## 2023-10-08 RX ORDER — SODIUM CHLORIDE, SODIUM LACTATE, POTASSIUM CHLORIDE, CALCIUM CHLORIDE 600; 310; 30; 20 MG/100ML; MG/100ML; MG/100ML; MG/100ML
INJECTION, SOLUTION INTRAVENOUS CONTINUOUS
Status: DISCONTINUED | OUTPATIENT
Start: 2023-10-08 | End: 2023-10-09 | Stop reason: HOSPADM

## 2023-10-08 RX ORDER — LANOLIN 100 %
OINTMENT (GRAM) TOPICAL PRN
Status: DISCONTINUED | OUTPATIENT
Start: 2023-10-08 | End: 2023-10-09 | Stop reason: HOSPADM

## 2023-10-08 RX ORDER — FERROUS SULFATE 325(65) MG
325 TABLET ORAL 2 TIMES DAILY WITH MEALS
Status: DISCONTINUED | OUTPATIENT
Start: 2023-10-08 | End: 2023-10-09 | Stop reason: HOSPADM

## 2023-10-08 RX ORDER — IBUPROFEN 800 MG/1
800 TABLET ORAL EVERY 8 HOURS SCHEDULED
Status: DISCONTINUED | OUTPATIENT
Start: 2023-10-08 | End: 2023-10-09 | Stop reason: HOSPADM

## 2023-10-08 RX ORDER — SODIUM CHLORIDE 0.9 % (FLUSH) 0.9 %
5-40 SYRINGE (ML) INJECTION PRN
Status: DISCONTINUED | OUTPATIENT
Start: 2023-10-08 | End: 2023-10-09 | Stop reason: HOSPADM

## 2023-10-08 RX ORDER — OXYCODONE HYDROCHLORIDE 5 MG/1
5 TABLET ORAL EVERY 4 HOURS PRN
Status: DISCONTINUED | OUTPATIENT
Start: 2023-10-08 | End: 2023-10-09 | Stop reason: HOSPADM

## 2023-10-08 RX ORDER — SODIUM CHLORIDE 9 MG/ML
INJECTION, SOLUTION INTRAVENOUS PRN
Status: DISCONTINUED | OUTPATIENT
Start: 2023-10-08 | End: 2023-10-09 | Stop reason: HOSPADM

## 2023-10-08 RX ADMIN — IBUPROFEN 800 MG: 800 TABLET, FILM COATED ORAL at 17:13

## 2023-10-08 RX ADMIN — DOCUSATE SODIUM 100 MG: 100 CAPSULE, LIQUID FILLED ORAL at 10:47

## 2023-10-08 RX ADMIN — Medication 10 ML: at 10:47

## 2023-10-08 RX ADMIN — DOCUSATE SODIUM 100 MG: 100 CAPSULE, LIQUID FILLED ORAL at 22:06

## 2023-10-08 RX ADMIN — ACETAMINOPHEN 1000 MG: 500 TABLET ORAL at 18:28

## 2023-10-08 RX ADMIN — IBUPROFEN 800 MG: 800 TABLET, FILM COATED ORAL at 06:48

## 2023-10-08 RX ADMIN — ACETAMINOPHEN 1000 MG: 500 TABLET ORAL at 10:47

## 2023-10-08 ASSESSMENT — PAIN DESCRIPTION - ORIENTATION
ORIENTATION: LOWER;MID
ORIENTATION: LOWER

## 2023-10-08 ASSESSMENT — PAIN DESCRIPTION - DESCRIPTORS
DESCRIPTORS: CRAMPING;DISCOMFORT
DESCRIPTORS: CRAMPING

## 2023-10-08 ASSESSMENT — PAIN DESCRIPTION - LOCATION
LOCATION: ABDOMEN
LOCATION: ABDOMEN

## 2023-10-08 ASSESSMENT — PAIN SCALES - GENERAL
PAINLEVEL_OUTOF10: 3
PAINLEVEL_OUTOF10: 2
PAINLEVEL_OUTOF10: 0
PAINLEVEL_OUTOF10: 0

## 2023-10-08 ASSESSMENT — PAIN - FUNCTIONAL ASSESSMENT: PAIN_FUNCTIONAL_ASSESSMENT: ACTIVITIES ARE NOT PREVENTED

## 2023-10-08 NOTE — PROGRESS NOTES
Kev Carrillo CRNA at bedside to give epidural redose due to inadequate pain control on the right side.

## 2023-10-08 NOTE — PROGRESS NOTES
First time get up to BR w/ assist x 2 RN's. Pt voided 500 cc urine without difficulty. Pt performed juan care per self after instruction. New ice pack, peripad, and underwear provided. Pt tolerated well.

## 2023-10-08 NOTE — ANESTHESIA PROCEDURE NOTES
Epidural Block    Patient location during procedure: OB  Start time: 10/7/2023 8:43 PM  End time: 10/7/2023 8:47 PM  Reason for block: labor epidural  Staffing  Performed: resident/CRNA   Resident/CRNA: CHARLES oVss CRNA  Performed by: CHARLES Voss CRNA  Authorized by:  CHARLES Voss CRNA    Epidural  Patient position: sitting  Prep: ChloraPrep  Patient monitoring: continuous pulse ox and frequent blood pressure checks  Approach: midline  Location: L4-5  Injection technique: DAHLIA saline  Provider prep: mask and sterile gloves  Needle  Needle type: Tuohy   Needle gauge: 18 G  Needle insertion depth: 4.5 cm  Catheter type: multi-orifice  Catheter at skin depth: 9 cm  Test dose: negativeCatheter Secured: tape and tegaderm  Assessment  Hemodynamics: stable  Attempts: 1  Outcomes: uncomplicated and patient tolerated procedure well  Preanesthetic Checklist  Completed: patient identified, IV checked, site marked, risks and benefits discussed, surgical/procedural consents, equipment checked, pre-op evaluation, timeout performed, anesthesia consent given, oxygen available, monitors applied/VS acknowledged, fire risk safety assessment completed and verbalized and blood product R/B/A discussed and consented

## 2023-10-08 NOTE — PLAN OF CARE
Problem: Vaginal Birth or  Section  Goal: Fetal and maternal status remain reassuring during the birth process  Description:  Birth OB-Pregnancy care plan goal which identifies if the fetal and maternal status remain reassuring during the birth process  10/8/2023 0041 by Noreen Majano RN  Outcome: Completed  10/7/2023 1439 by Vanesa Toscano RN  Outcome: Progressing     Problem: Postpartum  Goal: Experiences normal postpartum course  Description:  Postpartum OB-Pregnancy care plan goal which identifies if the mother is experiencing a normal postpartum course  10/8/2023 0041 by Noreen Majano RN  Outcome: Progressing  10/7/2023 1439 by Vanesa Toscano RN  Outcome: Progressing  Goal: Appropriate maternal -  bonding  Description:  Postpartum OB-Pregnancy care plan goal which identifies if the mother and  are bonding appropriately  10/8/2023 0041 by Noreen Majano RN  Outcome: Progressing  10/7/2023 1439 by Vanesa Toscano RN  Outcome: Progressing  Goal: Establishment of infant feeding pattern  Description:  Postpartum OB-Pregnancy care plan goal which identifies if the mother is establishing a feeding pattern with their   10/8/2023 0041 by Noreen Majano RN  Outcome: Progressing  10/7/2023 1439 by Vanesa Toscano RN  Outcome: Progressing  Goal: Incisions, wounds, or drain sites healing without S/S of infection  10/8/2023 0041 by Noreen Majano RN  Outcome: Progressing  10/7/2023 1439 by Vanesa Toscano RN  Outcome: Progressing     Problem: Pain  Goal: Verbalizes/displays adequate comfort level or baseline comfort level  10/8/2023 0041 by Noreen Majano RN  Outcome: Progressing  10/7/2023 1439 by Vanesa Toscano RN  Outcome: Progressing     Problem: Infection - Adult  Goal: Absence of infection at discharge  10/8/2023 0041 by Noreen Majano RN  Outcome: Progressing  10/7/2023 1439 by Vanesa Toscano RN  Outcome: Progressing  Goal: Absence of infection during hospitalization  10/8/2023 0041 by Nicholas Golden

## 2023-10-08 NOTE — H&P
Obstetrics History and Physical    HPI: Rhoda Marie is a 33 y.o. Maida Murders at 45w4d who presents for scheduled induction of labor. Denies vaginal bleeding. Denies leaking fluid. Denies contractions. Endorses fetal movement. Pregnancy has been complicated by Hx of GHTN in previous pregnancy and Abnormal GCT 1. Prenatal Flow:   Aneuploidy / Carrier Screen: [id number] pending     AFP:  PNL: O+/ab(-), RI, HepBnr, Hep Cnr, HIVnr, RPRnr, Varicella IMM, Hgb 13.2, Plt 257, UDS neg, UCx neg, GCCT neg, Pap NILM 23    PIH Baseline: Plt 257 / Cr <0.5 / ALT 8 / AST 18 / UA 3.6 / LDH pend / UPC 0.1  Anatomy: 23 normal female anatomy, Ant placenta no previa. CL 4.93 cm without funneling. 28 wk:   Hgb 11.4  Plt 160  3hr GTT > elected to check BS x 2 weeks instead, normal values, continue normal prenatal care   Tdap: given 23    GBS: negative     Review of Systems:  The following ROS was otherwise negative, except as noted in the HPI: constitutional, HEENT, respiratory, cardiovascular, gastrointestinal, genitourinary, skin, musculoskeletal, neurological, psych    OBGYN Provider : Narinder Hoyos DO     Obstetrical History:  OB History    Para Term  AB Living   3 2 2 0 0 2   SAB IAB Ectopic Molar Multiple Live Births   0 0 0 0 0 2      # Outcome Date GA Lbr Shahid/2nd Weight Sex Delivery Anes PTL Lv   3 Current            2 Term 21 37w4d 02:46 / 00:16 6 lb 14 oz (3.119 kg) F Vag-Spont EPI N KIRK      Birth Estela Goel     ID: [id number]     HUGS: 179      Name: Walter Villa: 9  Apgar5: 9   1 Term 10/24/17 40w4d   F Vag-Spont EPI N KIRK      Name: Jalen Brothers: 9  Apgar5: 9       Past Medical History:   Past Medical History:   Diagnosis Date    Elevated blood pressure reading in office without diagnosis of hypertension     Gestational (pregnancy-induced) hypertension without significant proteinuria, complicating childbirth     Gestational hypertension,

## 2023-10-08 NOTE — PROGRESS NOTES
[facility] Ob/Gyn  Post Partum Progress Note    Subjective:   Patient is a 33 y.o. y/o  female S/P  at 39w2d EGA. PPD # 1. The pregnancy was complicated by: Hx of GHTN in previous pregnancy and Abnormal GCT 1hr, normal 3hr     Doing well today. No current complaints are noted including headache, change in vision, fever, chills, chest pain, shortness of breath, nausea, vomiting, diarrhea or constipation. The patient denies dizziness with ambulation or calf tenderness. Voiding spontaneously. Dionne Safe is described as minimal. The patient plans to breastfeed. Objective:   GENERAL APPEARANCE: alert, well appearing, in no apparent distress  LUNGS: Resp effort normal   HEART: Reg rate   ABDOMEN POSTPARTUM: benign non-tender, without masses or organomegaly palpable . Uterine Fundus firm, NT, Below umbilicus.    EXTREMITIES: no redness or tenderness in the calves or thighs, no edema  SKIN: normal coloration and turgor, no rashes, no suspicious skin lesions noted    Pertinent Labs:   H/H: 12.0/36.9 > 10.4/32.3    Assessment / Plan:  1) Post partum    - meeting post partum milestones    - hemodynamically stable     2) O+ / RI / GBS neg    3) Baby Information (Delivered 10/7/2023 2209)   [facility]: [id number] [facility]    Delivering clinician: Koby Reno DO GA: 39w2d Delivery method: Vaginal, Spontaneous   Birth weight: 8 lb 14 oz (4.025 kg) Apgars 1, 5, 10 min: 9, 9, -- Forceps?: No   Feeding plan: Breast Milk             Disposition:   Post Partum Instructions reviewed   Anticipate discharge on PPD # 2 pending clinical status     Koby Reno DO

## 2023-10-08 NOTE — PROGRESS NOTES
Pt actively pushing. RN and Dr. Marina Hearn remains in continuous attendance at the bedside assessing fetal heart rate.

## 2023-10-08 NOTE — LACTATION NOTE
This note was copied from a baby's chart. Lactation Progress Note      Data:    Initial consult for multip experienced breast feeder on day 1 pp with an infant born at 39.2 weeks gestation. FAUSTINA breast fed her others, the longest for 12 months. States that she has needed a nipple shield with both of her others and is using with this baby. FAUSTINA reports this shalom is latching well and she is confident at this time. Action:    Introduced self to patient as lactation, name and phone number written on white board in room. Educated mother about what to expect over the next  24-48 hours with infant feedings, infant output, how to know infant is getting enough, the importance of a deep latch and how to achieve it, how to break suction and try again if latch is shallow, normal  behavior, how to wake a sleepy infant to feed, how the breasts work to make milk, protecting milk supply, breastfeeding recommendations for exclusivity and duration, what to expect with cluster feeding, how to hand express colostrum, and breast care. Educated mother about infant feeding cues and encouraged mother to allow infant to breast feed on demand anytime feeding cues are shown and if no feeding cues are shown to attempt to wake infant to feed every 2-3 hours. If infant is still too sleepy to latch to hand express colostrum into infants mouth for about ten minutes, then try again in 2-3 hours. After the first day of life to breast feed a minimum of 8-12 times a day per 24 hour period. Also encouraged mother to avoid giving infant a pacifier, bottle, or pump for at least the first two weeks of life or until breast feeding is well established. Encouraged good hydration, nutrition, and rest, and to keep taking prenatal or multivitamin while lactating. Encouraged much skin to skin between mother and infant. Breast feeding log reviewed, all questions answered. Mother encouraged to call lactation for F/U care as needed.

## 2023-10-08 NOTE — PLAN OF CARE
Problem: Postpartum  Goal: Experiences normal postpartum course  Description:  Postpartum OB-Pregnancy care plan goal which identifies if the mother is experiencing a normal postpartum course  Outcome: Progressing  Goal: Appropriate maternal -  bonding  Description:  Postpartum OB-Pregnancy care plan goal which identifies if the mother and  are bonding appropriately  Outcome: Progressing  Goal: Establishment of infant feeding pattern  Description:  Postpartum OB-Pregnancy care plan goal which identifies if the mother is establishing a feeding pattern with their   Outcome: Progressing  Goal: Incisions, wounds, or drain sites healing without S/S of infection  Outcome: Progressing     Problem: Pain  Goal: Verbalizes/displays adequate comfort level or baseline comfort level  Outcome: Progressing     Problem: Infection - Adult  Goal: Absence of infection at discharge  Outcome: Progressing  Goal: Absence of infection during hospitalization  Outcome: Progressing  Goal: Absence of fever/infection during anticipated neutropenic period  Outcome: Progressing     Problem: Safety - Adult  Goal: Free from fall injury  Outcome: Progressing     Problem: Discharge Planning  Goal: Discharge to home or other facility with appropriate resources  Outcome: Progressing     Problem: Neurosensory - Adult  Goal: Achieves maximal functionality and self care  Outcome: Progressing     Problem: Respiratory - Adult  Goal: Achieves optimal ventilation and oxygenation  Outcome: Progressing     Problem: Skin/Tissue Integrity - Adult  Goal: Incisions, wounds, or drain sites healing without S/S of infection  Outcome: Progressing     Problem: Gastrointestinal - Adult  Goal: Minimal or absence of nausea and vomiting  Outcome: Progressing  Goal: Maintains adequate nutritional intake  Outcome: Progressing     Problem: Genitourinary - Adult  Goal: Absence of urinary retention  Outcome: Progressing

## 2023-10-08 NOTE — PROGRESS NOTES
RN and Dr. Wiley Sheriff remained at bedside throughout pushing. EFM continuously assessed. Vaginal delivery of viable infant.

## 2023-10-09 VITALS
TEMPERATURE: 97.6 F | OXYGEN SATURATION: 98 % | DIASTOLIC BLOOD PRESSURE: 85 MMHG | RESPIRATION RATE: 18 BRPM | HEART RATE: 65 BPM | SYSTOLIC BLOOD PRESSURE: 129 MMHG

## 2023-10-09 PROCEDURE — 6370000000 HC RX 637 (ALT 250 FOR IP)

## 2023-10-09 PROCEDURE — 6370000000 HC RX 637 (ALT 250 FOR IP): Performed by: OBSTETRICS & GYNECOLOGY

## 2023-10-09 PROCEDURE — 99238 HOSP IP/OBS DSCHRG MGMT 30/<: CPT | Performed by: OBSTETRICS & GYNECOLOGY

## 2023-10-09 RX ORDER — FERROUS SULFATE 325(65) MG
325 TABLET ORAL
Qty: 30 TABLET | Refills: 0 | Status: SHIPPED | OUTPATIENT
Start: 2023-10-09

## 2023-10-09 RX ORDER — IBUPROFEN 800 MG/1
800 TABLET ORAL EVERY 8 HOURS SCHEDULED
Qty: 40 TABLET | Refills: 0 | Status: SHIPPED | OUTPATIENT
Start: 2023-10-09

## 2023-10-09 RX ORDER — PSEUDOEPHEDRINE HCL 30 MG
100 TABLET ORAL 2 TIMES DAILY PRN
Qty: 30 CAPSULE | Refills: 0 | Status: SHIPPED | OUTPATIENT
Start: 2023-10-09

## 2023-10-09 RX ADMIN — WITCH HAZEL: 500 SOLUTION RECTAL; TOPICAL at 11:53

## 2023-10-09 RX ADMIN — IBUPROFEN 800 MG: 800 TABLET, FILM COATED ORAL at 06:32

## 2023-10-09 RX ADMIN — BENZOCAINE AND LEVOMENTHOL: 200; 5 SPRAY TOPICAL at 11:53

## 2023-10-09 RX ADMIN — DOCUSATE SODIUM 100 MG: 100 CAPSULE, LIQUID FILLED ORAL at 09:13

## 2023-10-09 NOTE — PLAN OF CARE
Problem: Postpartum  Goal: Experiences normal postpartum course  Description:  Postpartum OB-Pregnancy care plan goal which identifies if the mother is experiencing a normal postpartum course  10/9/2023 0950 by Gale Pratt  Outcome: Completed  10/8/2023 2306 by Juany Álvarez RN  Outcome: Progressing  Goal: Appropriate maternal -  bonding  Description:  Postpartum OB-Pregnancy care plan goal which identifies if the mother and  are bonding appropriately  10/9/2023 0950 by Gale Pratt  Outcome: Completed  10/8/2023 2306 by Juany Álvarez RN  Outcome: Progressing  Goal: Establishment of infant feeding pattern  Description:  Postpartum OB-Pregnancy care plan goal which identifies if the mother is establishing a feeding pattern with their   10/9/2023 0950 by Gale Pratt  Outcome: Completed  10/8/2023 2306 by Juany Álvarez RN  Outcome: Progressing  Goal: Incisions, wounds, or drain sites healing without S/S of infection  Outcome: Completed     Problem: Pain  Goal: Verbalizes/displays adequate comfort level or baseline comfort level  10/9/2023 0950 by Heather PATTERSON  Outcome: Completed  10/8/2023 2306 by Juany Álvarez RN  Outcome: Progressing     Problem: Infection - Adult  Goal: Absence of infection at discharge  Outcome: Completed  Goal: Absence of infection during hospitalization  Outcome: Completed  Goal: Absence of fever/infection during anticipated neutropenic period  Outcome: Completed     Problem: Safety - Adult  Goal: Free from fall injury  10/9/2023 0950 by Heather PATTERSON  Outcome: Completed  10/8/2023 2306 by Juany Álvarez RN  Outcome: Progressing     Problem: Discharge Planning  Goal: Discharge to home or other facility with appropriate resources  Outcome: Completed     Problem: Neurosensory - Adult  Goal: Achieves maximal functionality and self care  Outcome: Completed     Problem: Respiratory - Adult  Goal: Achieves optimal ventilation and

## 2023-10-09 NOTE — LACTATION NOTE
This note was copied from a baby's chart. Lactation Progress Note      Data:   F/U with multip 2PP with breastfeeding and lactation rounds. Infant LGA. MOB feeding infant at time of consult with on/off suck burst present using a nipple shield. MOB states that infant has been breastfeeding well using nipple shield. States that infant can't sustain latch if shield is not in place. Denies concerns or questions at time of consult. Comfortable with discharge home today. Feeding Method: Feeding Method Used: Breastfeeding 150/115min  Urine output:  x7 established   Stool output:   x1 established  Percent weight change from birth:  -5%    Action: Introduced self to patient as LC for the day. Name and number placed on whiteboard. BF education reviewed with patient and what to expect over then next 1-2 weeks with mature milk production, infant feedings, infant output, breast care, engorgement prevention, pacifier, bottle use, and pumping information. Reviewed nipple shield use, how to properly apply, and tips to wean infant from shield as she tolerates sustaining latch. Encouraged outpatient care to help. Discharge binder also reviewed with patient with f/u care and resources provided. All questions answered at this time. RN updated with education that was provided to patient. Patient instructed to call for f/u care as needed. Response: MOB is comfortable with breastfeeding at time of consult. Verbalizes understanding of bf education that was provided. Will call for f/u care as needed.

## 2023-10-09 NOTE — FLOWSHEET NOTE
Pt noted to be sleeping soundly and had requested during last assessment at 2200 not to wake her up for scheduled Motrin & Tylenol doses. Motrin dose held at present time.

## 2023-10-09 NOTE — ANESTHESIA POSTPROCEDURE EVALUATION
Department of Anesthesiology  Postprocedure Note    Patient: Rebecca Hayes  MRN: [id number]  YOB: 1995  Date of evaluation: 10/8/2023      Procedure Summary     Date: 10/07/23 Room / Location:     Anesthesia Start: 2035 Anesthesia Stop: 2223    Procedure: Labor Analgesia Diagnosis:     Scheduled Providers:  Responsible Provider: Cielo Doyle MD    Anesthesia Type: Epidural ASA Status: 2 - Emergent          Anesthesia Type: Epidural    Josie Phase I: Josie Score: 9    Josie Phase II: Josie Score: 9      Anesthesia Post Evaluation    Patient location during evaluation: bedside  Patient participation: complete - patient participated  Level of consciousness: awake and alert  Airway patency: patent  Nausea & Vomiting: no nausea and no vomiting  Complications: no  Cardiovascular status: hemodynamically stable  Respiratory status: nonlabored ventilation, spontaneous ventilation and room air  Hydration status: stable  Multimodal analgesia pain management approach  Pain management: adequate and satisfactory to patient

## 2023-10-09 NOTE — PROGRESS NOTES
[facility] Ob/Gyn  Post Partum Progress Note    Subjective:   Patient is a 33 y.o. y/o  female S/P  at 39w2d EGA. PPD # 2. The pregnancy was complicated by: Hx of GHTN in previous pregnancy and Abnormal GCT 1hr, normal 3hr     Patient is doing well today. No current complaints are noted including headache, change in vision, fever, chills, chest pain, shortness of breath, nausea, vomiting, diarrhea or constipation. The patient denies dizziness with ambulation or calf tenderness. Voiding spontaneously. Carley Heller is described as minimal. The patient plans to breastfeed. Objective:   GENERAL APPEARANCE: alert, well appearing, in no apparent distress  LUNGS: Resp effort normal   HEART: Reg rate   ABDOMEN POSTPARTUM: benign non-tender, without masses or organomegaly palpable . Uterine Fundus firm, NT, Below umbilicus.    EXTREMITIES: no redness or tenderness in the calves or thighs, no edema  SKIN: normal coloration and turgor, no rashes, no suspicious skin lesions noted    Pertinent Labs:   H/H: 12.0/36.9 (10/7/23) > 10.4/32.3 (10/8/23)    Assessment / Plan:  1) Post partum    - meeting post partum milestones    - hemodynamically stable     2) O+ / RI / GBS neg    3) Baby Information (Delivered 10/7/2023 2209)   MRN: [id number] Bed: [address] [place] [address]    Delivering clinician: Vani Hart DO GA: 39w2d Delivery method: Vaginal, Spontaneous   Birth weight: 8 lb 14 oz (4.025 kg) Apgars 1, 5, 10 min: 9, 9, -- Forceps?: No   Feeding plan: Breast Milk             Disposition:   Discharge today    Jaclyn Benavides MD

## 2023-10-09 NOTE — DISCHARGE INSTR - DIET

## 2023-10-09 NOTE — PROGRESS NOTES
ID bands checked. Mother's ID band and one of baby's ID bands removed and taped to discharge instruction sheet, signed by patient and witnessed by RN. Discharged in wheelchair, holding baby in car seat. Patient discharged in stable condition accompanied by fob. Patient verbalizes understanding of discharge instructions and denies further questions. Prescriptions given.

## 2023-10-09 NOTE — DISCHARGE INSTRUCTIONS
Thank you for the opportunity to care for you and your family. We hope that you are happy with the care we provided during your stay in the [facility]. We want to ensure that you have the help you need when you leave the hospital. If there is anything we can assist you with, please let us know. Breastfeeding mothers may contact our lactation specialists with any problems or questions. [facility] services phone number is [phone]. Leave a message and your call will be returned. Please refer to the information provided in the \"Caring for Yourself\" tab in your discharge binder (Guidelines for [employer]). The following are warning signs to remember. Call 911 if you have:    Chest pain or pressure  Shortness of breath, even at rest  Thoughts of harming yourself or your baby  Seizures    Call your healthcare provider if you have:    Temperature of 100.4 degrees or higher  Stitches that are not healing        -- Swelling, bleeding, drainage, foul odor, redness or warmth in/around your           stitches, staples, or incision (scar)        -- Bad smelling blood or discharge from the vagina  Vaginal bleeding that has increased         -- Soaking through one pad in an hour        -- You are passing clots larger than the size of a lemon  Red, warm tender area(s) in your breast or calf  Headache that does not get better, even after taking medicine; or headache with vision changes    Remember to notify all healthcare providers from your date of delivery to up to one year after giving birth! CARING FOR YOURSELF        DIET/ACTIVITY    Eat a well balanced diet focusing on foods high in fiber and protein. Drink plenty of fluids, especially water. To avoid constipation you may take a mild stool softener as recommended by your doctor or midwife. Gradually increase your activity. Resume an exercise regime only after being advised by your doctor or midwife.   When sitting or lying down,

## 2023-10-09 NOTE — PROGRESS NOTES
Discharge Phone Call Log  Patient Name: George Chao     [facility] Care Provider: Nico Still DO Discharge Date: 10/9/2023    Disposition of baby:    Phone Number: [phone] (home)     Attempts to Contact:  Date:    Nurse  Date:    Nurse  Date:    Nurse    Introduce yourself and explain the questionnaire. 1.  Now that you are at home is your pain being well controlled? Y/N   What pain reducing measures are you using? ____________________________________        Information for the patient's :  Geri Cordero [[id number]]   Delivery Method: Vaginal, Spontaneous      2. Are you having any infant feeding issues? Y/N _____________________________      If breastfeeding, were you satisfied with the breastfeeding support services offered? Y/N    3. Any engorgement problems? Y/N  Have you had to supplement? Y/N    4. Have you made or have you already had your first appointment with the baby's doctor? Y/N                If no, do you know when to schedule it? 5. Have you scheduled your follow-up appointment? Y/N  If no, do you know when to schedule it? Y/N    6. Did your nurses and physicians include you in the plan of care, communicating with      you respectfully, and in a manner easy to understand? Y/N       Comments:  ___________________________________________________________    7. Is there anyone in particular you would like to mention who provided care for you?  ______________________________        8. Do you have any questions about your discharge instructions? Y/N  Did you receive a Discharge [address]? Y/N       9. Did your discharge occur in a timely manner? Y/N        Comments: __________________________________________________________    Remember to describe the hospital survey and ask patient to return it when possible.     Questions During Interview:__________________________________________________________    Teaching During interview

## 2023-10-09 NOTE — DISCHARGE SUMMARY
DISCHARGE SUMMARY      Primary Diagnosis: intrauterine pregnancy at 39 weeks 2 days gestation  Secondary Diagnosis: history of gestational HTN in previous pregnancy, abnormal 1 hour glucose test  Procedures: normal spontaneous vaginal delivery, vaginal cystectomy  Referrals: lactation consultant  Significant Findings: viable female   Reason for Hospitalization: elective induction of labor  Complications: none        Discharge Instructions:    Diet: common adult  Activity: activity as tolerated, no heavy lifting or driving for 2 weeks, pelvic rest x 6 weeks  Medications:   Disposition: Home  Condition on discharge: Stable  Follow up: in 2 week(s)

## 2023-10-09 NOTE — PLAN OF CARE
Problem: Postpartum  Goal: Experiences normal postpartum course  Description:  Postpartum OB-Pregnancy care plan goal which identifies if the mother is experiencing a normal postpartum course  10/8/2023 2306 by Corby Andres RN  Outcome: Progressing  10/8/2023 1445 by Faustina Mccarty RN  Outcome: Progressing  Goal: Appropriate maternal -  bonding  Description:  Postpartum OB-Pregnancy care plan goal which identifies if the mother and  are bonding appropriately  10/8/2023 2306 by Corby Andres RN  Outcome: Progressing  10/8/2023 1445 by Faustina Mccarty RN  Outcome: Progressing  Goal: Establishment of infant feeding pattern  Description:  Postpartum OB-Pregnancy care plan goal which identifies if the mother is establishing a feeding pattern with their   10/8/2023 2306 by Corby Andres RN  Outcome: Progressing  10/8/2023 144 by Faustina Mccarty RN  Outcome: Progressing  Goal: Incisions, wounds, or drain sites healing without S/S of infection  10/8/2023 1445 by Faustina Mccarty RN  Outcome: Progressing     Problem: Pain  Goal: Verbalizes/displays adequate comfort level or baseline comfort level  10/8/2023 2306 by Corby Andres RN  Outcome: Progressing  10/8/2023 1445 by Faustina Mccarty RN  Outcome: Progressing     Problem: Infection - Adult  Goal: Absence of infection at discharge  10/8/2023 1445 by Faustina Mccarty RN  Outcome: Progressing  Goal: Absence of infection during hospitalization  10/8/2023 1445 by Faustina Mccarty RN  Outcome: Progressing  Goal: Absence of fever/infection during anticipated neutropenic period  10/8/2023 1445 by Faustina Mccarty RN  Outcome: Progressing     Problem: Safety - Adult  Goal: Free from fall injury  10/8/2023 2306 by Corby Andres RN  Outcome: Progressing  10/8/2023 1445 by Faustina Mccarty RN  Outcome: Progressing     Problem: Discharge Planning  Goal: Discharge to home or other facility with appropriate resources  10/8/2023 144 by Faustina Mccarty RN  Outcome:

## 2023-10-31 ENCOUNTER — POSTPARTUM VISIT (OUTPATIENT)
Dept: OBGYN CLINIC | Age: 28
End: 2023-10-31
Payer: COMMERCIAL

## 2023-10-31 VITALS
TEMPERATURE: 98.6 F | WEIGHT: 127.8 LBS | SYSTOLIC BLOOD PRESSURE: 112 MMHG | BODY MASS INDEX: 23.37 KG/M2 | DIASTOLIC BLOOD PRESSURE: 70 MMHG

## 2023-10-31 PROCEDURE — G8484 FLU IMMUNIZE NO ADMIN: HCPCS | Performed by: OBSTETRICS & GYNECOLOGY

## 2023-10-31 PROCEDURE — 1036F TOBACCO NON-USER: CPT | Performed by: OBSTETRICS & GYNECOLOGY

## 2023-10-31 PROCEDURE — G8420 CALC BMI NORM PARAMETERS: HCPCS | Performed by: OBSTETRICS & GYNECOLOGY

## 2023-10-31 PROCEDURE — 99213 OFFICE O/P EST LOW 20 MIN: CPT | Performed by: OBSTETRICS & GYNECOLOGY

## 2023-10-31 PROCEDURE — 1111F DSCHRG MED/CURRENT MED MERGE: CPT | Performed by: OBSTETRICS & GYNECOLOGY

## 2023-10-31 PROCEDURE — G8427 DOCREV CUR MEDS BY ELIG CLIN: HCPCS | Performed by: OBSTETRICS & GYNECOLOGY

## 2023-11-07 ENCOUNTER — TELEPHONE (OUTPATIENT)
Dept: OBGYN CLINIC | Age: 28
End: 2023-11-07

## 2023-11-14 NOTE — TELEPHONE ENCOUNTER
Pt called stating someone called her about her Liletta telling her to jose alfredo our office and tell us that they require a PA for this RX.

## 2023-12-27 ENCOUNTER — TELEPHONE (OUTPATIENT)
Dept: OBGYN CLINIC | Age: 28
End: 2023-12-27

## 2023-12-27 NOTE — TELEPHONE ENCOUNTER
Patient called to let us know that her insurance sent her a letter stating that the prior authorization request form that was faxed to them was missing the insurance ID number. Information applied to the form and refaxed to [facility].

## 2024-02-08 ENCOUNTER — NURSE ONLY (OUTPATIENT)
Dept: OBGYN CLINIC | Age: 29
End: 2024-02-08
Payer: COMMERCIAL

## 2024-02-08 DIAGNOSIS — Z30.430 ENCOUNTER FOR IUD INSERTION: Primary | ICD-10-CM

## 2024-02-08 LAB
CONTROL: NORMAL
PREGNANCY TEST URINE, POC: NEGATIVE

## 2024-02-08 PROCEDURE — 81025 URINE PREGNANCY TEST: CPT | Performed by: OBSTETRICS & GYNECOLOGY

## 2024-02-22 ENCOUNTER — OFFICE VISIT (OUTPATIENT)
Dept: OBGYN CLINIC | Age: 29
End: 2024-02-22
Payer: COMMERCIAL

## 2024-02-22 VITALS
DIASTOLIC BLOOD PRESSURE: 72 MMHG | OXYGEN SATURATION: 97 % | BODY MASS INDEX: 23.45 KG/M2 | HEART RATE: 95 BPM | SYSTOLIC BLOOD PRESSURE: 116 MMHG | HEIGHT: 62 IN | WEIGHT: 127.4 LBS

## 2024-02-22 DIAGNOSIS — Z30.430 ENCOUNTER FOR IUD INSERTION: Primary | ICD-10-CM

## 2024-02-22 DIAGNOSIS — Z32.02 NEGATIVE PREGNANCY TEST: ICD-10-CM

## 2024-02-22 PROBLEM — Z34.90 ENCOUNTER FOR ELECTIVE INDUCTION OF LABOR: Status: RESOLVED | Noted: 2023-10-07 | Resolved: 2024-02-22

## 2024-02-22 PROBLEM — Z34.90 ENCOUNTER FOR INDUCTION OF LABOR: Status: RESOLVED | Noted: 2023-10-07 | Resolved: 2024-02-22

## 2024-02-22 PROBLEM — Z34.82 PRENATAL CARE, SUBSEQUENT PREGNANCY IN SECOND TRIMESTER: Status: RESOLVED | Noted: 2023-04-26 | Resolved: 2024-02-22

## 2024-02-22 PROBLEM — J36 PERITONSILLAR ABSCESS: Status: RESOLVED | Noted: 2020-09-12 | Resolved: 2024-02-22

## 2024-02-22 LAB
CONTROL: NORMAL
PREGNANCY TEST URINE, POC: NORMAL

## 2024-02-22 PROCEDURE — 81025 URINE PREGNANCY TEST: CPT | Performed by: OBSTETRICS & GYNECOLOGY

## 2024-02-22 PROCEDURE — 99999 PR OFFICE/OUTPT VISIT,PROCEDURE ONLY: CPT | Performed by: OBSTETRICS & GYNECOLOGY

## 2024-02-22 NOTE — PROGRESS NOTES
IUD Insertion Procedure Note    Pre-operative Diagnosis: Desires LARC    Post-operative Diagnosis: Same    Indication: Contraception    Procedure Details   Urine pregnancy test was done and result was negative. The risks (including infection, bleeding, pain, and uterine perforation) and benefits of the procedure were explained to the patient and written informed consent was obtained. Speculum was then placed. Single tooth tenaculum was then applied to the anterior lip of the cervix.  The uterus was then sounded to a depth of 8 cm.  The IUD was then placed according to  instructions.  The applicator was then removed.  The IUD strings were visible and trimmed to 2 cm.  The single tooth tenaculum was removed.      Excellent hemostasis was assured.  The speculum was then removed.      The patient tolerated the procedure well. She was instructed to take OTC analgesics for pain relief. She was given return precautions and verbally stated understanding.     IUD Information:  Type of IUD: Lilletta   Lot #: See med administration   Expiration: see med administration   Condition: Stable     Complications: None    Plan:  The patient was advised to call for any fever or for prolonged or severe pain or bleeding. She was advised to use OTC acetaminophen, OTC ibuprofen as needed for mild to moderate pain.   FU in 4 weeks for Position Check with US.     Allison Bryant DO

## 2024-04-04 ENCOUNTER — OFFICE VISIT (OUTPATIENT)
Dept: OBGYN CLINIC | Age: 29
End: 2024-04-04
Payer: COMMERCIAL

## 2024-04-04 VITALS
DIASTOLIC BLOOD PRESSURE: 76 MMHG | TEMPERATURE: 97.9 F | HEART RATE: 93 BPM | WEIGHT: 127 LBS | BODY MASS INDEX: 23.23 KG/M2 | SYSTOLIC BLOOD PRESSURE: 116 MMHG

## 2024-04-04 DIAGNOSIS — Z30.431 IUD CHECK UP: Primary | ICD-10-CM

## 2024-04-04 DIAGNOSIS — Z97.5 IUD (INTRAUTERINE DEVICE) IN PLACE: ICD-10-CM

## 2024-04-04 PROCEDURE — G8427 DOCREV CUR MEDS BY ELIG CLIN: HCPCS | Performed by: OBSTETRICS & GYNECOLOGY

## 2024-04-04 PROCEDURE — 99213 OFFICE O/P EST LOW 20 MIN: CPT | Performed by: OBSTETRICS & GYNECOLOGY

## 2024-04-04 PROCEDURE — 1036F TOBACCO NON-USER: CPT | Performed by: OBSTETRICS & GYNECOLOGY

## 2024-04-04 PROCEDURE — G8420 CALC BMI NORM PARAMETERS: HCPCS | Performed by: OBSTETRICS & GYNECOLOGY

## 2024-04-04 NOTE — PROGRESS NOTES
Return Gyn Office Visit  IUD Check     CC:   Chief Complaint   Patient presents with    IUD placement check     Still bleeding        HPI:  28 y.o. who presents to clinic for recheck after IUD placement.   Pt doing well today. States pain and bleeding have resolved since insertion. Reviewed US results showing IUD in appropriate position. Questions answered.     Review of Systems  Review of Systems   Constitutional: Negative for activity change.   Respiratory: Negative for chest tightness and shortness of breath.    Cardiovascular: Negative for chest pain.   Gastrointestinal: Negative for abdominal distention and abdominal pain.   Genitourinary: Positive for vaginal bleeding. Negative for menstrual problem and pelvic pain.   Neurological: Negative for light-headedness.   Psychiatric/Behavioral: Negative for behavioral problems and dysphoric mood.     Objective:  /76 (Site: Left Upper Arm, Position: Sitting, Cuff Size: Medium Adult)   Pulse 93   Temp 97.9 °F (36.6 °C) (Infrared)   Wt 57.6 kg (127 lb)   Breastfeeding Yes   BMI 23.23 kg/m²   General: Alert, well appearing, no acute distress  Resp: Effort WNL   Abdomen: Soft, nontender, nondistended   Pelvic: Deferred   Skin: No visible rashes / lesions or growths  Extremities: No redness or tenderness, neg Maddi's sign    Images:   PELVIC ULTRASOUND without DOPPLER INTERROGATION   NON OB     DATE: 04/04/2024     PHYSICIAN: MAURA Bryant D.O.      SONOGRAPHER: RAMY Benavidez [facility]     INDICATION: IUD placement     TYPE OF SCAN: vaginal     FINDINGS:    The cul de sac is normal. No free fluid appreciated.  The cervix is normal and not enlarged.  Nabothian cyst/s is noted within the uterine cervix.  The uterus measures 7.38cm x 4.38cm x 3.27cm.    The uterus is anteverted.  The endometrium measures 2.97 mm. IUD seen within the endometrial canal.  The myometrium is homogeneous in appearance. No uterine anomalies are noted.   The right ovary is present.  The right ovary